# Patient Record
Sex: MALE | Race: WHITE | NOT HISPANIC OR LATINO | URBAN - METROPOLITAN AREA
[De-identification: names, ages, dates, MRNs, and addresses within clinical notes are randomized per-mention and may not be internally consistent; named-entity substitution may affect disease eponyms.]

---

## 2017-01-14 ENCOUNTER — EMERGENCY (EMERGENCY)
Facility: HOSPITAL | Age: 62
LOS: 1 days | Discharge: HOME | End: 2017-01-14
Attending: EMERGENCY MEDICINE
Payer: COMMERCIAL

## 2017-01-14 VITALS
RESPIRATION RATE: 20 BRPM | HEART RATE: 76 BPM | TEMPERATURE: 98 F | OXYGEN SATURATION: 100 % | DIASTOLIC BLOOD PRESSURE: 109 MMHG | SYSTOLIC BLOOD PRESSURE: 216 MMHG

## 2017-01-14 VITALS — DIASTOLIC BLOOD PRESSURE: 95 MMHG | SYSTOLIC BLOOD PRESSURE: 203 MMHG

## 2017-01-14 DIAGNOSIS — M79.89 OTHER SPECIFIED SOFT TISSUE DISORDERS: ICD-10-CM

## 2017-01-14 DIAGNOSIS — L03.011 CELLULITIS OF RIGHT FINGER: ICD-10-CM

## 2017-01-14 DIAGNOSIS — I10 ESSENTIAL (PRIMARY) HYPERTENSION: ICD-10-CM

## 2017-01-14 DIAGNOSIS — Z91.013 ALLERGY TO SEAFOOD: ICD-10-CM

## 2017-01-14 DIAGNOSIS — Z23 ENCOUNTER FOR IMMUNIZATION: ICD-10-CM

## 2017-01-14 PROCEDURE — 90471 IMMUNIZATION ADMIN: CPT

## 2017-01-14 PROCEDURE — 99283 EMERGENCY DEPT VISIT LOW MDM: CPT | Mod: 25

## 2017-01-14 PROCEDURE — 73130 X-RAY EXAM OF HAND: CPT | Mod: 26,RT

## 2017-01-14 PROCEDURE — 90715 TDAP VACCINE 7 YRS/> IM: CPT

## 2017-01-14 PROCEDURE — 99284 EMERGENCY DEPT VISIT MOD MDM: CPT

## 2017-01-14 PROCEDURE — 73130 X-RAY EXAM OF HAND: CPT

## 2017-01-14 RX ORDER — CEPHALEXIN 500 MG
500 CAPSULE ORAL ONCE
Qty: 0 | Refills: 0 | Status: DISCONTINUED | OUTPATIENT
Start: 2017-01-14 | End: 2017-01-14

## 2017-01-14 RX ORDER — TETANUS TOXOID, REDUCED DIPHTHERIA TOXOID AND ACELLULAR PERTUSSIS VACCINE, ADSORBED 5; 2.5; 8; 8; 2.5 [IU]/.5ML; [IU]/.5ML; UG/.5ML; UG/.5ML; UG/.5ML
0.5 SUSPENSION INTRAMUSCULAR ONCE
Qty: 0 | Refills: 0 | Status: COMPLETED | OUTPATIENT
Start: 2017-01-14 | End: 2017-01-14

## 2017-01-14 RX ORDER — AMLODIPINE BESYLATE 2.5 MG/1
1 TABLET ORAL
Qty: 14 | Refills: 0 | OUTPATIENT
Start: 2017-01-14 | End: 2017-01-28

## 2017-01-14 RX ADMIN — Medication 0.2 MILLIGRAM(S): at 15:54

## 2017-01-14 RX ADMIN — Medication 1 TABLET(S): at 15:53

## 2017-01-14 RX ADMIN — TETANUS TOXOID, REDUCED DIPHTHERIA TOXOID AND ACELLULAR PERTUSSIS VACCINE, ADSORBED 0.5 MILLILITER(S): 5; 2.5; 8; 8; 2.5 SUSPENSION INTRAMUSCULAR at 15:48

## 2017-01-14 NOTE — ED STATDOCS - CARE PLAN
Principal Discharge DX:	Cellulitis of other specified site  Secondary Diagnosis:	Essential hypertension

## 2017-01-14 NOTE — ED STATDOCS - DETAILS:
I, Josephine Msoley, performed the initial face to face bedside interview with this patient regarding history of present illness, review of symptoms and relevant past medical, social and family history.  I completed an independent physical examination.  I was the initial provider who evaluated this patient. I have signed out the follow up of any pending tests (i.e. labs, radiological studies) to the ACP.  I have communicated the patient’s plan of care and disposition with the ACP.  The history, relevant review of systems, past medical and surgical history, medical decision making, and physical examination was documented by the scribe in my presence and I attest to the accuracy of the documentation.

## 2017-01-14 NOTE — ED STATDOCS - OBJECTIVE STATEMENT
62 y/o male with hx of HTN presents to ED c/o erythema and swelling to 5th digit on right hand with slight pus onset this morning. Pt reports he had a wooden splinter in place a few days ago and he thought he had gotten it out. However, today pt woke up and had swelling to his finger with a white spot. He pushed the white spot and the splinter came out. Denies fever. Denies smoking. No further complaints at this time.

## 2017-01-14 NOTE — ED STATDOCS - NS ED MD SCRIBE ATTENDING SCRIBE SECTIONS
HISTORY OF PRESENT ILLNESS/REVIEW OF SYSTEMS/PHYSICAL EXAM/PAST MEDICAL/SURGICAL/SOCIAL HISTORY/HIV/INTAKE ASSESSMENT/SCREENINGS/DISPOSITION/VITAL SIGNS( Pullset)

## 2017-01-14 NOTE — ED STATDOCS - PROGRESS NOTE DETAILS
Patient has full finger extension. + mildly limited flexion of the right 5th finger. No forearm tenderness. No specific fluctuance noted. No discharge. Spoke to Dr. Shaina TORRES and discussed the case. The plan was to begin Augmentin and initiate warm soaks and have patient follow-up in office.  **Patient also reports of non-compliance with HTN medication (unknown med). He is between PCP's. Spoke to Dr. Shaina TORRES and discussed the case. The plan was to begin Augmentin and initiate warm soaks and have patient follow-up in office.  **Patient also reports of non-compliance with HTN medication (unknown med). He is between PCP's. No HA, dizziness, or visual changes at this time. BP is trending down. Will discharge home with BP medications prescribed.

## 2017-01-14 NOTE — ED ADULT TRIAGE NOTE - CHIEF COMPLAINT QUOTE
Patient arrived to ED today with c/o infection to right 5th digit.  Patient states he got a splinter about 2 days ago.

## 2020-09-13 ENCOUNTER — RESULT REVIEW (OUTPATIENT)
Age: 65
End: 2020-09-13

## 2020-09-14 ENCOUNTER — APPOINTMENT (OUTPATIENT)
Dept: DERMATOLOGY | Facility: CLINIC | Age: 65
End: 2020-09-14
Payer: COMMERCIAL

## 2020-09-14 PROCEDURE — 11102 TANGNTL BX SKIN SINGLE LES: CPT

## 2020-09-14 PROCEDURE — 99202 OFFICE O/P NEW SF 15 MIN: CPT | Mod: 25

## 2020-10-05 ENCOUNTER — RESULT REVIEW (OUTPATIENT)
Age: 65
End: 2020-10-05

## 2020-10-05 ENCOUNTER — APPOINTMENT (OUTPATIENT)
Dept: DERMATOLOGY | Facility: CLINIC | Age: 65
End: 2020-10-05
Payer: COMMERCIAL

## 2020-10-05 PROCEDURE — 11103 TANGNTL BX SKIN EA SEP/ADDL: CPT | Mod: 59

## 2020-10-05 PROCEDURE — 99212 OFFICE O/P EST SF 10 MIN: CPT | Mod: 25

## 2020-10-05 PROCEDURE — 11102 TANGNTL BX SKIN SINGLE LES: CPT

## 2022-04-07 ENCOUNTER — APPOINTMENT (OUTPATIENT)
Dept: DERMATOLOGY | Facility: CLINIC | Age: 67
End: 2022-04-07
Payer: COMMERCIAL

## 2022-04-07 PROCEDURE — 99214 OFFICE O/P EST MOD 30 MIN: CPT

## 2022-04-18 ENCOUNTER — APPOINTMENT (OUTPATIENT)
Dept: DERMATOLOGY | Facility: CLINIC | Age: 67
End: 2022-04-18

## 2023-06-20 ENCOUNTER — NON-APPOINTMENT (OUTPATIENT)
Age: 68
End: 2023-06-20

## 2023-06-20 ENCOUNTER — APPOINTMENT (OUTPATIENT)
Dept: RHEUMATOLOGY | Facility: CLINIC | Age: 68
End: 2023-06-20
Payer: COMMERCIAL

## 2023-06-20 VITALS
SYSTOLIC BLOOD PRESSURE: 142 MMHG | OXYGEN SATURATION: 95 % | DIASTOLIC BLOOD PRESSURE: 80 MMHG | TEMPERATURE: 97.3 F | WEIGHT: 236 LBS | HEIGHT: 71 IN | BODY MASS INDEX: 33.04 KG/M2 | HEART RATE: 81 BPM

## 2023-06-20 DIAGNOSIS — Z84.0 FAMILY HISTORY OF DISEASES OF THE SKIN AND SUBCUTANEOUS TISSUE: ICD-10-CM

## 2023-06-20 PROBLEM — Z00.00 ENCOUNTER FOR PREVENTIVE HEALTH EXAMINATION: Status: ACTIVE | Noted: 2023-06-20

## 2023-06-20 PROCEDURE — 99204 OFFICE O/P NEW MOD 45 MIN: CPT

## 2023-06-20 NOTE — ASSESSMENT
[FreeTextEntry1] : 68M with HTN, HLD, and psoriasis, here for evaluation for psoriatic arthritis. \par Based on patient's history of psoriasis along with dactylitis noted in b/l hands, as well as nail pitting seen on exam, there is a high suspicion for psoriatic arthritis.\par \par At this time will treat with meloxicam 7.5 mg BID until labs/ultrasound have resulted, but plan will likely be to start a TNF-i such as Humira subQ injections once every 2 weeks- patient was given an information handout on Humira. \par At this time will check labs including ESR, CRP, HLA B27, tests for RA, SLE, and Sjogrens, and will also order US of bilateral hands and wrists. \par \par Pending lab results will likely start TNF-i (not ordered yet). Patient was already advised risks vs. benefits of Humira including risk of immunosuppression/vulnerability to infection.

## 2023-06-20 NOTE — HISTORY OF PRESENT ILLNESS
[FreeTextEntry1] : 68M HTN on amlodipine and HCTZ, HLD, here for evaluation for possible PsA. has had scaly patches on scalp, RLE, as well as elbows and umbilicus for past 2 years; saw a dermatologist 1 year ago who stated he may have psoriatic arthritis but he was never started on the medication. \par Pt states his hands have been stiff and swollen for past couple of years. Has difficulty making a fist in either hand. Pain in R. wrist occasionally as well as R. ankle joint. He has taken Alleve PRN once daily which relieves the pain mildly.\par \par He does have L. knee pain but he needs a L. knee replacement. \par \par Has been given topical steroids for the scalp. \par No eye pain, redness or blurry vision. No dyspnea. No oral or nasal ulcers. No hematuria. No hair or weight loss. No Raynauds. \par No nail pitting or ridging reported (although seen on exam today). No hx of IBD. \par No hx of CHF, cancer or demyelinating diseases.\par No alcohol use. \par \par Family history: Mother had extensive psoriasis; maternal uncles had psoriasis.  [Weight Loss] : no weight loss [Malar Facial Rash] : no malar facial rash [Skin Lesions] : skin lesions [Skin Nodules] : no skin nodules [Oral Ulcers] : no oral ulcers [Dry Mouth] : no dry mouth [Shortness of Breath] : no shortness of breath [Chest Pain] : no chest pain [Arthralgias] : arthralgias [Joint Swelling] : joint swelling [Morning Stiffness] : morning stiffness [Visual Changes] : no visual changes [Eye Pain] : no eye pain [Eye Redness] : no eye redness [Dry Eyes] : no dry eyes

## 2023-06-20 NOTE — REVIEW OF SYSTEMS
[Recent Weight Loss (___ Lbs)] : no recent weight loss [Arthralgias] : arthralgias [Joint Pain] : joint pain [Joint Swelling] : joint swelling [Joint Stiffness] : joint stiffness [As Noted in HPI] : as noted in HPI [Skin Lesions] : skin lesion [Negative] : Heme/Lymph [de-identified] : psoriasis

## 2023-06-20 NOTE — PHYSICAL EXAM
[General Appearance - Alert] : alert [General Appearance - In No Acute Distress] : in no acute distress [General Appearance - Well Developed] : well developed [General Appearance - Well-Appearing] : healthy appearing [Sclera] : the sclera and conjunctiva were normal [Extraocular Movements] : extraocular movements were intact [Outer Ear] : the ears and nose were normal in appearance [Neck Appearance] : the appearance of the neck was normal [] : no respiratory distress [Exaggerated Use Of Accessory Muscles For Inspiration] : no accessory muscle use [Edema] : there was no peripheral edema [FreeTextEntry1] : psoriasis patches on b/l elbow extensor surfaces, scalp, and RLE overlying shin with erythematous scales.  [No Focal Deficits] : no focal deficits [Oriented To Time, Place, And Person] : oriented to person, place, and time [Affect] : the affect was normal [Mood] : the mood was normal

## 2023-07-06 ENCOUNTER — NON-APPOINTMENT (OUTPATIENT)
Age: 68
End: 2023-07-06

## 2023-07-06 LAB
ALBUMIN SERPL ELPH-MCNC: 4.5 G/DL
ALP BLD-CCNC: 189 U/L
ALT SERPL-CCNC: 16 U/L
ANA SER IF-ACNC: NEGATIVE
ANION GAP SERPL CALC-SCNC: 14 MMOL/L
APPEARANCE: CLEAR
AST SERPL-CCNC: 15 U/L
BILIRUB SERPL-MCNC: 0.3 MG/DL
BILIRUBIN URINE: NEGATIVE
BLOOD URINE: NEGATIVE
BUN SERPL-MCNC: 28 MG/DL
C3 SERPL-MCNC: 155 MG/DL
C4 SERPL-MCNC: 33 MG/DL
CALCIUM SERPL-MCNC: 10 MG/DL
CCP AB SER IA-ACNC: <8 UNITS
CENTROMERE IGG SER-ACNC: <0.2 AL
CHLORIDE SERPL-SCNC: 104 MMOL/L
CO2 SERPL-SCNC: 24 MMOL/L
COLOR: YELLOW
CREAT SERPL-MCNC: 0.94 MG/DL
CREAT SPEC-SCNC: 156 MG/DL
CREAT/PROT UR: 0.1 RATIO
CRP SERPL-MCNC: 9 MG/L
DSDNA AB SER-ACNC: 24 IU/ML
EGFR: 87 ML/MIN/1.73M2
ENA RNP AB SER IA-ACNC: <0.2 AL
ENA SCL70 IGG SER IA-ACNC: <0.2 AL
ENA SM AB SER IA-ACNC: <0.2 AL
ENA SS-A AB SER IA-ACNC: <0.2 AL
ENA SS-B AB SER IA-ACNC: <0.2 AL
ERYTHROCYTE [SEDIMENTATION RATE] IN BLOOD BY WESTERGREN METHOD: 34 MM/HR
GLUCOSE QUALITATIVE U: NEGATIVE MG/DL
GLUCOSE SERPL-MCNC: 93 MG/DL
HAV IGM SER QL: NONREACTIVE
HBV CORE IGG+IGM SER QL: NONREACTIVE
HBV CORE IGM SER QL: NONREACTIVE
HBV SURFACE AG SER QL: NONREACTIVE
HCV AB SER QL: NONREACTIVE
HCV S/CO RATIO: 0.09 S/CO
HLA-B27 QL NAA+PROBE: NORMAL
KETONES URINE: NEGATIVE MG/DL
LEUKOCYTE ESTERASE URINE: NEGATIVE
M TB IFN-G BLD-IMP: NEGATIVE
NITRITE URINE: NEGATIVE
PH URINE: 5.5
POTASSIUM SERPL-SCNC: 4.2 MMOL/L
PROT SERPL-MCNC: 7 G/DL
PROT UR-MCNC: 15 MG/DL
PROTEIN URINE: NEGATIVE MG/DL
QUANTIFERON TB PLUS MITOGEN MINUS NIL: 4.33 IU/ML
QUANTIFERON TB PLUS NIL: 0.02 IU/ML
QUANTIFERON TB PLUS TB1 MINUS NIL: -0.01 IU/ML
QUANTIFERON TB PLUS TB2 MINUS NIL: -0.01 IU/ML
RF+CCP IGG SER-IMP: NEGATIVE
RHEUMATOID FACT SER QL: <10 IU/ML
SODIUM SERPL-SCNC: 141 MMOL/L
SPECIFIC GRAVITY URINE: 1.02
URATE SERPL-MCNC: 6.6 MG/DL
UROBILINOGEN URINE: 0.2 MG/DL

## 2023-09-18 ENCOUNTER — RESULT REVIEW (OUTPATIENT)
Age: 68
End: 2023-09-18

## 2023-09-18 ENCOUNTER — APPOINTMENT (OUTPATIENT)
Dept: RHEUMATOLOGY | Facility: CLINIC | Age: 68
End: 2023-09-18
Payer: COMMERCIAL

## 2023-09-18 VITALS
DIASTOLIC BLOOD PRESSURE: 89 MMHG | WEIGHT: 230 LBS | BODY MASS INDEX: 32.2 KG/M2 | HEART RATE: 80 BPM | HEIGHT: 71 IN | SYSTOLIC BLOOD PRESSURE: 157 MMHG | TEMPERATURE: 97.2 F | OXYGEN SATURATION: 98 %

## 2023-09-18 DIAGNOSIS — M25.569 PAIN IN UNSPECIFIED KNEE: ICD-10-CM

## 2023-09-18 DIAGNOSIS — R79.82 ELEVATED C-REACTIVE PROTEIN (CRP): ICD-10-CM

## 2023-09-18 DIAGNOSIS — M54.50 LOW BACK PAIN, UNSPECIFIED: ICD-10-CM

## 2023-09-18 DIAGNOSIS — Z78.9 OTHER SPECIFIED HEALTH STATUS: ICD-10-CM

## 2023-09-18 PROCEDURE — 99214 OFFICE O/P EST MOD 30 MIN: CPT

## 2023-09-18 RX ORDER — AMLODIPINE BESYLATE AND BENAZEPRIL HYDROCHLORIDE 5; 20 MG/1; MG/1
5-20 CAPSULE ORAL
Refills: 0 | Status: ACTIVE | COMMUNITY

## 2023-09-18 RX ORDER — APREMILAST 10-20-30MG
10 & 20 & 30 KIT ORAL
Qty: 1 | Refills: 1 | Status: DISCONTINUED | COMMUNITY
Start: 2023-07-06 | End: 2023-09-18

## 2023-09-20 LAB
ALBUMIN SERPL ELPH-MCNC: 4.4 G/DL
ALP BLD-CCNC: 171 U/L
ALT SERPL-CCNC: 19 U/L
ANION GAP SERPL CALC-SCNC: 10 MMOL/L
AST SERPL-CCNC: 18 U/L
BASOPHILS # BLD AUTO: 0.06 K/UL
BASOPHILS NFR BLD AUTO: 0.8 %
BILIRUB SERPL-MCNC: 0.3 MG/DL
BUN SERPL-MCNC: 22 MG/DL
CALCIUM SERPL-MCNC: 9.7 MG/DL
CHLORIDE SERPL-SCNC: 105 MMOL/L
CO2 SERPL-SCNC: 25 MMOL/L
CREAT SERPL-MCNC: 0.88 MG/DL
CRP SERPL-MCNC: <3 MG/L
EGFR: 93 ML/MIN/1.73M2
EOSINOPHIL # BLD AUTO: 0.27 K/UL
EOSINOPHIL NFR BLD AUTO: 3.5 %
ERYTHROCYTE [SEDIMENTATION RATE] IN BLOOD BY WESTERGREN METHOD: 23 MM/HR
GLUCOSE SERPL-MCNC: 94 MG/DL
HCT VFR BLD CALC: 45.7 %
HGB BLD-MCNC: 14.6 G/DL
HIV1+2 AB SPEC QL IA.RAPID: NONREACTIVE
IMM GRANULOCYTES NFR BLD AUTO: 0.3 %
LYMPHOCYTES # BLD AUTO: 1.45 K/UL
LYMPHOCYTES NFR BLD AUTO: 18.5 %
MAN DIFF?: NORMAL
MCHC RBC-ENTMCNC: 29.7 PG
MCHC RBC-ENTMCNC: 31.9 GM/DL
MCV RBC AUTO: 92.9 FL
MONOCYTES # BLD AUTO: 0.81 K/UL
MONOCYTES NFR BLD AUTO: 10.4 %
NEUTROPHILS # BLD AUTO: 5.21 K/UL
NEUTROPHILS NFR BLD AUTO: 66.5 %
PLATELET # BLD AUTO: 295 K/UL
POTASSIUM SERPL-SCNC: 4.4 MMOL/L
PROT SERPL-MCNC: 7 G/DL
RBC # BLD: 4.92 M/UL
RBC # FLD: 13.7 %
SODIUM SERPL-SCNC: 141 MMOL/L
WBC # FLD AUTO: 7.82 K/UL

## 2023-12-18 ENCOUNTER — APPOINTMENT (OUTPATIENT)
Dept: RADIOLOGY | Facility: CLINIC | Age: 68
End: 2023-12-18
Payer: COMMERCIAL

## 2023-12-18 ENCOUNTER — OUTPATIENT (OUTPATIENT)
Dept: OUTPATIENT SERVICES | Facility: HOSPITAL | Age: 68
LOS: 1 days | End: 2023-12-18
Payer: COMMERCIAL

## 2023-12-18 DIAGNOSIS — M25.569 PAIN IN UNSPECIFIED KNEE: ICD-10-CM

## 2023-12-18 PROCEDURE — 72100 X-RAY EXAM L-S SPINE 2/3 VWS: CPT | Mod: 26

## 2023-12-18 PROCEDURE — 72100 X-RAY EXAM L-S SPINE 2/3 VWS: CPT

## 2023-12-18 PROCEDURE — 72200 X-RAY EXAM SI JOINTS: CPT

## 2023-12-18 PROCEDURE — 72200 X-RAY EXAM SI JOINTS: CPT | Mod: 26

## 2023-12-18 PROCEDURE — 73565 X-RAY EXAM OF KNEES: CPT | Mod: 26

## 2023-12-18 PROCEDURE — 73565 X-RAY EXAM OF KNEES: CPT

## 2023-12-26 ENCOUNTER — RX RENEWAL (OUTPATIENT)
Age: 68
End: 2023-12-26

## 2023-12-26 RX ORDER — MELOXICAM 7.5 MG/1
7.5 TABLET ORAL DAILY
Qty: 60 | Refills: 0 | Status: ACTIVE | COMMUNITY
Start: 2023-06-20 | End: 1900-01-01

## 2023-12-27 ENCOUNTER — APPOINTMENT (OUTPATIENT)
Dept: RHEUMATOLOGY | Facility: CLINIC | Age: 68
End: 2023-12-27
Payer: COMMERCIAL

## 2023-12-27 VITALS
HEART RATE: 84 BPM | TEMPERATURE: 97.3 F | WEIGHT: 223 LBS | OXYGEN SATURATION: 96 % | BODY MASS INDEX: 31.22 KG/M2 | DIASTOLIC BLOOD PRESSURE: 92 MMHG | HEIGHT: 71 IN | SYSTOLIC BLOOD PRESSURE: 164 MMHG

## 2023-12-27 DIAGNOSIS — M47.816 SPONDYLOSIS W/OUT MYELOPATHY OR RADICULOPATHY, LUMBAR REGION: ICD-10-CM

## 2023-12-27 PROCEDURE — 99214 OFFICE O/P EST MOD 30 MIN: CPT

## 2023-12-27 RX ORDER — HYDROCHLOROTHIAZIDE 12.5 MG/1
TABLET ORAL
Refills: 0 | Status: ACTIVE | COMMUNITY

## 2023-12-27 RX ORDER — CLOBETASOL PROPIONATE 0.5 MG/G
0.05 OINTMENT TOPICAL
Qty: 45 | Refills: 0 | Status: ACTIVE | COMMUNITY
Start: 2023-09-18 | End: 1900-01-01

## 2023-12-27 NOTE — HISTORY OF PRESENT ILLNESS
[FreeTextEntry1] : 67 y/o M w/ HTN; HLD; reports FH of Psoriasis in Mother and maternal uncles; with patient himself having history of Psoriasis of scalp, Rt>Lt shins/knees and very mildly of the arms; w/ +HLA-B27; with high ESR/CRP; states he had joint pain in the hands w/ trouble making a fist; Rt wrist; Rt ankle that is much improved on Otezla now.  Today he states he is taking Otezla 30 mg BID that he is tolerating well. Denies any swelling or redness or warmth of any joints on Otezla now. He states the psoriasis of the skin has improved on Otezla. Today has mild psoriasis of scalp & mild psoriasis of Rt shin. States clobetasol shampoo and ointment helps. He states the joint pain is much improved on Otezla. States he notes intermittent Lt>rt knee pain w/ doing stairs or walking a lot and told in the past he needs knee replacement & did xray to review. He states he notes LBP worse w/ sitting; better w/ stretching; not much pain today. Denies any loss of bowel incontinence or saddle anesthesias. Denies any fever/chills, no rashes, no ulcers, no dry eyes, no dry mouth, no raynaud's, no infectious diarrhea or  symptoms at this time.

## 2023-12-27 NOTE — ASSESSMENT
[FreeTextEntry1] : 67 y/o M w/ HTN; HLD; reports FH of Psoriasis in Mother and maternal uncles; with patient himself having history of Psoriasis of scalp, Rt>Lt shins/knees and very mildly of the arms; w/ +HLA-B27; with high ESR/CRP states he had joint pain in the hands w/ trouble making a fist; Rt wrist; Rt ankle that is much improved on Otezla now.  PsA: -reviewed labs 9/18/23 w/ CRP normal now; decreasing ESR=23 (from 34); CBC ok; CMP ok; HIV negative; 6/20/23 + HLA-B27; high ESR=34; high CRP=9; hepatitis negative; quantiferon gold negative -discussed r/b/s of refilling Otezla 30mg PO BID with pt agreeable and prescription sent as below -labs as below to monitor today -labs as below given to do for f/u also  Psoriasis: mild psoriasis of scalp, mild psoriasis of Rt shin  -discussed r/b/s of clobetasol shampoo PRN w/ pt agreeable and prescription sent as below -discussed r/b/s of clobetasol ointment PRN w/ pt agreeable and prescription sent as below  Knee OA: reviewed on xray b/l knees 12/18/23 w/ b/l OA -consider steroid injections -states told in the past that he needs Lt knee replacement  LBP: intermittent -Reviewed xray L spine 12/18/23 w/ anterolisthesis; SI normal -Meloxicam PRN w/ food sparingly if needed  -educated on symptoms to monitor for in detail and alert us if any concerns. -knows to stay up to date on health maintenance w/ PCP -f/u in 10-12 weeks w/ labs please or sooner as needed.

## 2023-12-27 NOTE — PHYSICAL EXAM
[General Appearance - Alert] : alert [General Appearance - In No Acute Distress] : in no acute distress [Sclera] : the sclera and conjunctiva were normal [Extraocular Movements] : extraocular movements were intact [Outer Ear] : the ears and nose were normal in appearance [Neck Appearance] : the appearance of the neck was normal [] : no respiratory distress [Respiration, Rhythm And Depth] : normal respiratory rhythm and effort [Heart Rate And Rhythm] : heart rate was normal and rhythm regular [Heart Sounds] : normal S1 and S2 [Abdomen Soft] : soft [Abdomen Tenderness] : non-tender [Cervical Lymph Nodes Enlarged Anterior Bilaterally] : anterior cervical [Supraclavicular Lymph Nodes Enlarged Bilaterally] : supraclavicular [No CVA Tenderness] : no ~M costovertebral angle tenderness [Motor Tone] : muscle strength and tone were normal [No Focal Deficits] : no focal deficits [Impaired Insight] : insight and judgment were intact [Mood] : the mood was normal [FreeTextEntry1] : mild psoriasis of scalp, mildly Rt shin psoriasis

## 2023-12-28 LAB
ALBUMIN SERPL ELPH-MCNC: 4.4 G/DL
ALP BLD-CCNC: 174 U/L
ALT SERPL-CCNC: 22 U/L
ANION GAP SERPL CALC-SCNC: 12 MMOL/L
AST SERPL-CCNC: 20 U/L
BASOPHILS # BLD AUTO: 0.07 K/UL
BASOPHILS NFR BLD AUTO: 0.9 %
BILIRUB SERPL-MCNC: 0.3 MG/DL
BUN SERPL-MCNC: 18 MG/DL
CALCIUM SERPL-MCNC: 10.1 MG/DL
CHLORIDE SERPL-SCNC: 103 MMOL/L
CO2 SERPL-SCNC: 28 MMOL/L
CREAT SERPL-MCNC: 0.93 MG/DL
CRP SERPL-MCNC: <3 MG/L
EGFR: 88 ML/MIN/1.73M2
EOSINOPHIL # BLD AUTO: 0.29 K/UL
EOSINOPHIL NFR BLD AUTO: 3.6 %
ERYTHROCYTE [SEDIMENTATION RATE] IN BLOOD BY WESTERGREN METHOD: 28 MM/HR
GLUCOSE SERPL-MCNC: 98 MG/DL
HCT VFR BLD CALC: 46.1 %
HGB BLD-MCNC: 14.5 G/DL
IMM GRANULOCYTES NFR BLD AUTO: 0.2 %
LYMPHOCYTES # BLD AUTO: 1.54 K/UL
LYMPHOCYTES NFR BLD AUTO: 18.9 %
MAN DIFF?: NORMAL
MCHC RBC-ENTMCNC: 29.1 PG
MCHC RBC-ENTMCNC: 31.5 GM/DL
MCV RBC AUTO: 92.4 FL
MONOCYTES # BLD AUTO: 0.67 K/UL
MONOCYTES NFR BLD AUTO: 8.2 %
NEUTROPHILS # BLD AUTO: 5.56 K/UL
NEUTROPHILS NFR BLD AUTO: 68.2 %
PLATELET # BLD AUTO: 304 K/UL
POTASSIUM SERPL-SCNC: 4.4 MMOL/L
PROT SERPL-MCNC: 6.8 G/DL
RBC # BLD: 4.99 M/UL
RBC # FLD: 13.2 %
SODIUM SERPL-SCNC: 142 MMOL/L
WBC # FLD AUTO: 8.15 K/UL

## 2024-01-24 ENCOUNTER — RX RENEWAL (OUTPATIENT)
Age: 69
End: 2024-01-24

## 2024-03-06 ENCOUNTER — RX RENEWAL (OUTPATIENT)
Age: 69
End: 2024-03-06

## 2024-03-07 ENCOUNTER — RX RENEWAL (OUTPATIENT)
Age: 69
End: 2024-03-07

## 2024-03-07 RX ORDER — DICLOFENAC SODIUM 1% 10 MG/G
1 GEL TOPICAL
Qty: 100 | Refills: 0 | Status: ACTIVE | COMMUNITY
Start: 2023-12-27 | End: 1900-01-01

## 2024-03-18 ENCOUNTER — APPOINTMENT (OUTPATIENT)
Dept: RHEUMATOLOGY | Facility: CLINIC | Age: 69
End: 2024-03-18
Payer: COMMERCIAL

## 2024-03-18 VITALS
HEIGHT: 71 IN | TEMPERATURE: 98 F | HEART RATE: 86 BPM | OXYGEN SATURATION: 97 % | SYSTOLIC BLOOD PRESSURE: 152 MMHG | BODY MASS INDEX: 30.8 KG/M2 | WEIGHT: 220 LBS | DIASTOLIC BLOOD PRESSURE: 84 MMHG

## 2024-03-18 DIAGNOSIS — L40.9 PSORIASIS, UNSPECIFIED: ICD-10-CM

## 2024-03-18 DIAGNOSIS — R70.0 ELEVATED ERYTHROCYTE SEDIMENTATION RATE: ICD-10-CM

## 2024-03-18 DIAGNOSIS — L40.50 ARTHROPATHIC PSORIASIS, UNSPECIFIED: ICD-10-CM

## 2024-03-18 DIAGNOSIS — M17.0 BILATERAL PRIMARY OSTEOARTHRITIS OF KNEE: ICD-10-CM

## 2024-03-18 DIAGNOSIS — M43.10 SPONDYLOLISTHESIS, SITE UNSPECIFIED: ICD-10-CM

## 2024-03-18 DIAGNOSIS — Z15.89 GENETIC SUSCEPTIBILITY TO OTHER DISEASE: ICD-10-CM

## 2024-03-18 DIAGNOSIS — M25.562 PAIN IN LEFT KNEE: ICD-10-CM

## 2024-03-18 PROCEDURE — G2211 COMPLEX E/M VISIT ADD ON: CPT

## 2024-03-18 PROCEDURE — 99214 OFFICE O/P EST MOD 30 MIN: CPT | Mod: 25

## 2024-03-18 PROCEDURE — 20610 DRAIN/INJ JOINT/BURSA W/O US: CPT

## 2024-03-18 RX ORDER — TRIAMCINOLONE ACETONIDE 40 MG/ML
40 SUSPENSION INTRA-ARTERIAL; INTRAMUSCULAR
Qty: 1 | Refills: 0 | Status: COMPLETED | OUTPATIENT
Start: 2024-03-18

## 2024-03-18 RX ORDER — CLOBETASOL PROPIONATE 0.05 G/100ML
0.05 SHAMPOO TOPICAL DAILY
Qty: 1 | Refills: 1 | Status: ACTIVE | COMMUNITY
Start: 2023-09-18 | End: 1900-01-01

## 2024-03-18 RX ADMIN — TRIAMCINOLONE ACETONIDE 40 MG/ML: 40 INJECTION, SUSPENSION INTRA-ARTICULAR; INTRAMUSCULAR at 00:00

## 2024-03-18 NOTE — HISTORY OF PRESENT ILLNESS
[FreeTextEntry1] : 68 y/o M w/ HTN; HLD; reports FH of Psoriasis in Mother and maternal uncles; with patient himself having history of Psoriasis of scalp, Rt>Lt shins/knees and very mildly of the arms; w/ +HLA-B27; with high ESR/CRP; states he had joint pain in the hands w/ trouble making a fist; Rt wrist; Rt ankle that is much improved on Otezla now.  Today he states he is taking Otezla 30 mg BID that he is tolerating well. Denies any swelling or redness or warmth of any joints on Otezla now. He states the psoriasis of the skin has improved on Otezla. Today has mild psoriasis of scalp & mild psoriasis of Rt shin. States clobetasol shampoo and ointment helps. He states the joint pain is much improved on Otezla. Today he states he has achy left knee pain w/ OA and would like steroid injection to relieve the pain. States he notes intermittent Lt>rt knee pain w/ doing stairs or walking a lot and told in the past he needs knee replacement.  He states he notes LBP worse w/ sitting; better w/ stretching; not much pain today. Denies any loss of bowel incontinence or saddle anesthesias. Denies any fever/chills, no rashes, no ulcers, no dry eyes, no dry mouth, no raynaud's, no infectious diarrhea or  symptoms at this time.

## 2024-03-18 NOTE — REASON FOR VISIT
[Follow-Up: _____] : a [unfilled] follow-up visit [FreeTextEntry1] : PsA; psoriasis; OA; labs/med; Lt knee pain

## 2024-03-18 NOTE — REVIEW OF SYSTEMS
[As Noted in HPI] : as noted in HPI [Chills] : no chills [Fever] : no fever [Red Eyes] : eyes not red [Eye Pain] : no eye pain [Chest Pain] : no chest pain [Nosebleeds] : no nosebleeds [Abdominal Pain] : no abdominal pain [Shortness Of Breath] : no shortness of breath [Dysuria] : no dysuria [Confused] : no confusion [Suicidal] : not suicidal [Muscle Weakness] : no muscle weakness [Easy Bleeding] : no tendency for easy bleeding

## 2024-03-18 NOTE — PHYSICAL EXAM
[General Appearance - Alert] : alert [General Appearance - In No Acute Distress] : in no acute distress [Sclera] : the sclera and conjunctiva were normal [Extraocular Movements] : extraocular movements were intact [Outer Ear] : the ears and nose were normal in appearance [Neck Appearance] : the appearance of the neck was normal [Respiration, Rhythm And Depth] : normal respiratory rhythm and effort [Heart Rate And Rhythm] : heart rate was normal and rhythm regular [Heart Sounds] : normal S1 and S2 [Abdomen Soft] : soft [Abdomen Tenderness] : non-tender [Cervical Lymph Nodes Enlarged Anterior Bilaterally] : anterior cervical [Supraclavicular Lymph Nodes Enlarged Bilaterally] : supraclavicular [No CVA Tenderness] : no ~M costovertebral angle tenderness [Motor Tone] : muscle strength and tone were normal [] : no rash [No Focal Deficits] : no focal deficits [Mood] : the mood was normal [Impaired Insight] : insight and judgment were intact [FreeTextEntry1] : Lt knee tenderness w/ good ROM; No synovitis or effusion on exam noted today; Good ROM in b/l shoulders, no pelvic/girdle stiffness and able to stand up without using his hands

## 2024-03-18 NOTE — ASSESSMENT
[FreeTextEntry1] : 68 y/o M w/ HTN; HLD; reports FH of Psoriasis in Mother and maternal uncles; with patient himself having history of Psoriasis of scalp, Rt>Lt shins/knees and very mildly of the arms; w/ +HLA-B27; with high ESR/CRP states he had joint pain in the hands w/ trouble making a fist; Rt wrist; Rt ankle that is much improved on Otezla now.  PsA: -reviewed labs 12/27/23 w/ CRP normal now; raised ESR= 28 (from 23 from 34); CBC ok; CMP ok; 9/18/23 HIV negative; 6/20/23 + HLA-B27; high ESR=34; high CRP=9; hepatitis negative; quantiferon gold negative -discussed r/b/s of refilling Otezla 30mg PO BID with pt agreeable and prescription sent as below -labs as below to monitor   Psoriasis: mild psoriasis of scalp, mild psoriasis of Rt shin -discussed r/b/s of clobetasol shampoo PRN w/ pt agreeable and prescription sent as below -has clobetasol ointment PRN   Lt knee pain/OA: offered Kenalog 40 mg injection today w/ r/b/s discussed and patient agreeable. -reviewed on xray b/l knees 12/18/23 w/ b/l OA -states told in the past that he needs Lt knee replacement -has voltaren gel 1% PRN   LBP: intermittent -xray L spine 12/18/23 w/ anterolisthesis; SI normal -Meloxicam PRN w/ food sparingly if needed  -educated on symptoms to monitor for in detail and alert us if any concerns. -knows to stay up to date on health maintenance w/ PCP -f/u in 10-12 weeks w/ labs please or sooner as needed.

## 2024-03-18 NOTE — PROCEDURE
[Patient] : the patient [Today's Date:] : Date: [unfilled] [Risks] : risks [Benefits] : benefits [Alternatives] : alternatives [Consent Obtained] : written consent was obtained prior to the procedure and is detailed in the patient's record [#1 Site: ______] : #1 site identified in the [unfilled] [Therapeutic] : therapeutic [Ethyl Chloride] : ethyl chloride [Betadine] : betadine solution [Alcohol] : alcohol [25 gauge 1.5 inch] : A 25 gauge 1.5 inch needle was used [___ml 1% Lidocaine] : [unfilled] ml of 1% lidocaine [Tolerated Well] : the patient tolerated the procedure well [No Complications] : there were no complications [de-identified] : Kenalog 40 mg

## 2024-06-12 ENCOUNTER — APPOINTMENT (OUTPATIENT)
Dept: RHEUMATOLOGY | Facility: CLINIC | Age: 69
End: 2024-06-12

## 2024-06-24 ENCOUNTER — RX RENEWAL (OUTPATIENT)
Age: 69
End: 2024-06-24

## 2024-06-24 RX ORDER — APREMILAST 30 MG/1
30 TABLET, FILM COATED ORAL
Qty: 180 | Refills: 0 | Status: ACTIVE | COMMUNITY
Start: 2023-07-10 | End: 1900-01-01

## 2024-09-21 ENCOUNTER — RX RENEWAL (OUTPATIENT)
Age: 69
End: 2024-09-21

## 2024-09-24 ENCOUNTER — RESULT REVIEW (OUTPATIENT)
Age: 69
End: 2024-09-24

## 2024-09-24 ENCOUNTER — TRANSCRIPTION ENCOUNTER (OUTPATIENT)
Age: 69
End: 2024-09-24

## 2024-09-24 ENCOUNTER — OUTPATIENT (OUTPATIENT)
Dept: OUTPATIENT SERVICES | Facility: HOSPITAL | Age: 69
LOS: 1 days | End: 2024-09-24
Payer: COMMERCIAL

## 2024-09-24 VITALS
DIASTOLIC BLOOD PRESSURE: 87 MMHG | SYSTOLIC BLOOD PRESSURE: 130 MMHG | OXYGEN SATURATION: 98 % | HEART RATE: 59 BPM | RESPIRATION RATE: 17 BRPM

## 2024-09-24 VITALS
RESPIRATION RATE: 16 BRPM | DIASTOLIC BLOOD PRESSURE: 98 MMHG | WEIGHT: 227.96 LBS | SYSTOLIC BLOOD PRESSURE: 126 MMHG | HEIGHT: 71 IN | OXYGEN SATURATION: 100 % | HEART RATE: 130 BPM

## 2024-09-24 DIAGNOSIS — I48.0 PAROXYSMAL ATRIAL FIBRILLATION: ICD-10-CM

## 2024-09-24 DIAGNOSIS — Z98.890 OTHER SPECIFIED POSTPROCEDURAL STATES: Chronic | ICD-10-CM

## 2024-09-24 LAB
ANION GAP SERPL CALC-SCNC: 15 MMOL/L — SIGNIFICANT CHANGE UP (ref 5–17)
BUN SERPL-MCNC: 24.8 MG/DL — HIGH (ref 8–20)
CALCIUM SERPL-MCNC: 9.4 MG/DL — SIGNIFICANT CHANGE UP (ref 8.4–10.5)
CHLORIDE SERPL-SCNC: 100 MMOL/L — SIGNIFICANT CHANGE UP (ref 96–108)
CO2 SERPL-SCNC: 24 MMOL/L — SIGNIFICANT CHANGE UP (ref 22–29)
CREAT SERPL-MCNC: 1.14 MG/DL — SIGNIFICANT CHANGE UP (ref 0.5–1.3)
EGFR: 69 ML/MIN/1.73M2 — SIGNIFICANT CHANGE UP
GLUCOSE SERPL-MCNC: 156 MG/DL — HIGH (ref 70–99)
HCT VFR BLD CALC: 48.6 % — SIGNIFICANT CHANGE UP (ref 39–50)
HGB BLD-MCNC: 15.7 G/DL — SIGNIFICANT CHANGE UP (ref 13–17)
MAGNESIUM SERPL-MCNC: 2.2 MG/DL — SIGNIFICANT CHANGE UP (ref 1.8–2.6)
MCHC RBC-ENTMCNC: 29.3 PG — SIGNIFICANT CHANGE UP (ref 27–34)
MCHC RBC-ENTMCNC: 32.3 GM/DL — SIGNIFICANT CHANGE UP (ref 32–36)
MCV RBC AUTO: 90.8 FL — SIGNIFICANT CHANGE UP (ref 80–100)
PLATELET # BLD AUTO: 283 K/UL — SIGNIFICANT CHANGE UP (ref 150–400)
POTASSIUM SERPL-MCNC: 3.2 MMOL/L — LOW (ref 3.5–5.3)
POTASSIUM SERPL-SCNC: 3.2 MMOL/L — LOW (ref 3.5–5.3)
RBC # BLD: 5.35 M/UL — SIGNIFICANT CHANGE UP (ref 4.2–5.8)
RBC # FLD: 13.7 % — SIGNIFICANT CHANGE UP (ref 10.3–14.5)
SODIUM SERPL-SCNC: 139 MMOL/L — SIGNIFICANT CHANGE UP (ref 135–145)
WBC # BLD: 12.52 K/UL — HIGH (ref 3.8–10.5)
WBC # FLD AUTO: 12.52 K/UL — HIGH (ref 3.8–10.5)

## 2024-09-24 PROCEDURE — 36415 COLL VENOUS BLD VENIPUNCTURE: CPT

## 2024-09-24 PROCEDURE — 93325 DOPPLER ECHO COLOR FLOW MAPG: CPT

## 2024-09-24 PROCEDURE — 92960 CARDIOVERSION ELECTRIC EXT: CPT

## 2024-09-24 PROCEDURE — 83735 ASSAY OF MAGNESIUM: CPT

## 2024-09-24 PROCEDURE — 93312 ECHO TRANSESOPHAGEAL: CPT

## 2024-09-24 PROCEDURE — 93325 DOPPLER ECHO COLOR FLOW MAPG: CPT | Mod: 26

## 2024-09-24 PROCEDURE — 93312 ECHO TRANSESOPHAGEAL: CPT | Mod: 26

## 2024-09-24 PROCEDURE — 85027 COMPLETE CBC AUTOMATED: CPT

## 2024-09-24 PROCEDURE — 93320 DOPPLER ECHO COMPLETE: CPT

## 2024-09-24 PROCEDURE — 80048 BASIC METABOLIC PNL TOTAL CA: CPT

## 2024-09-24 PROCEDURE — 93005 ELECTROCARDIOGRAM TRACING: CPT

## 2024-09-24 PROCEDURE — 93320 DOPPLER ECHO COMPLETE: CPT | Mod: 26

## 2024-09-24 PROCEDURE — 93010 ELECTROCARDIOGRAM REPORT: CPT

## 2024-09-24 RX ORDER — METOPROLOL TARTRATE 50 MG
100 TABLET ORAL
Qty: 0 | Refills: 0 | DISCHARGE
Start: 2024-09-24

## 2024-09-24 RX ORDER — SACUBITRIL AND VALSARTAN 97; 103 MG/1; MG/1
1 TABLET, FILM COATED ORAL
Refills: 0 | DISCHARGE

## 2024-09-24 RX ORDER — APREMILAST 30 MG
1 KIT ORAL
Refills: 0 | DISCHARGE

## 2024-09-24 RX ORDER — APIXABAN 5 MG/1
1 TABLET, FILM COATED ORAL
Refills: 0 | DISCHARGE

## 2024-09-24 RX ORDER — HYDROCHLOROTHIAZIDE 50 MG/1
1 TABLET ORAL
Refills: 0 | DISCHARGE

## 2024-09-24 RX ORDER — ATORVASTATIN CALCIUM 10 MG/1
1 TABLET, FILM COATED ORAL
Refills: 0 | DISCHARGE

## 2024-09-24 RX ORDER — METOPROLOL TARTRATE 50 MG
2 TABLET ORAL
Refills: 0 | DISCHARGE

## 2024-09-24 RX ORDER — METOPROLOL TARTRATE 50 MG
50 TABLET ORAL
Qty: 0 | Refills: 0 | DISCHARGE
Start: 2024-09-24

## 2024-09-24 RX ADMIN — Medication 100 MILLIEQUIVALENT(S): at 11:39

## 2024-09-24 NOTE — PROGRESS NOTE ADULT - SUBJECTIVE AND OBJECTIVE BOX
Pt doing well s/p uncomplicated VJ & DCCV (200J x 1) with restoration of sinus rhythm. Denies complaint.     VJ: CONCLUSIONS:   1. Left ventricular systolic function is severely decreased with an ejection fraction visually estimated at 20 to 25 %.   2. Moderately enlarged right ventricular cavity size and reduced right ventricular systolic function.   3. Agitated saline injection was negative for intracardiac shunt.   4. Findings were discussed with patient, wife, EP team on 9/24/2024.   5. Mild aortic regurgitation.   6. Moderate to severe mitral regurgitation.   7. Mild tricuspid regurgitation.   8. No evidence of left atrial or left atrial appendage thrombus. The left atrial appendage emptying velocity is low.   9. Small pericardial effusion noted adjacent to the anterior right ventricle and trace pericardial effusion noted adjacent to the right atrium.  10. The patient was successfully cardioverted to sinus rhythm.    Exam:   VS: /90 HR 58 R16 SpO2 98%  General: NAD, Awake and alert   Skin: no erythema/edema/blistering at defib pad sites.   Card: S1/S2, regular   Resp: lungs CTA b/l  Abd: S/NTTP  Ext: no edema     Assessment:   70yo male former smoker with PMH of HTN, HLD, OA, and psoriasis who recently developed fatigue, LOCKHART and worsening SOB x 2 months. He was seen by Cardiologist (Dr. Pina) and was found to be in new onset AF w/ RVR. He was started on Eliquis and Metoprolol. Recent echo and stress performed.  Stress revealed "multiple perfusion defects which demonstrate reversibility or partial reversibility on prone imaging. May be linked to AF induced cardiomyopathy but can not exclude ischemia. Global hypokinesis. EF 28%." TTE revealed EF 20-25%. Dilated RA/LA. Abnormal RV function. Trace AI, mild-mod MI, mild pHTN, mild TI. He presented electively today for and is now status post VJ negative for HAO thrombus and uncomplicated DCCV with restoration of sinus rhythm.     Plan:   Observation on telemetry per post op protocol.    NPO until 15:00  Ambulate w/ assist once fully awake & back to baseline mental status w/ VSS.  Continue Eliquis 5mg twice daily. Importance of strict compliance with anticoagulation regimen reinforced with pt.   Decrease Toprol to 100mg once daily.   Start Entresto twice daily for GDMT.  Resume other home medications.   Anticipate d/c home once all criteria met, with outpt f/up with Dr. Pina and Dr Justice.    Pt doing well s/p uncomplicated VJ & DCCV (200J x 1) with restoration of sinus rhythm. Denies complaint.     EKG: SB at 57bpm. TX 174ms. QRSD 82ms. Twi anteriorly   VJ: CONCLUSIONS:   1. Left ventricular systolic function is severely decreased with an ejection fraction visually estimated at 20 to 25 %.   2. Moderately enlarged right ventricular cavity size and reduced right ventricular systolic function.   3. Agitated saline injection was negative for intracardiac shunt.   4. Findings were discussed with patient, wife, EP team on 9/24/2024.   5. Mild aortic regurgitation.   6. Moderate to severe mitral regurgitation.   7. Mild tricuspid regurgitation.   8. No evidence of left atrial or left atrial appendage thrombus. The left atrial appendage emptying velocity is low.   9. Small pericardial effusion noted adjacent to the anterior right ventricle and trace pericardial effusion noted adjacent to the right atrium.  10. The patient was successfully cardioverted to sinus rhythm.    Exam:   VS: /90 HR 58 R16 SpO2 98%  General: NAD, Awake and alert   Skin: no erythema/edema/blistering at defib pad sites.   Card: S1/S2, regular   Resp: lungs CTA b/l  Abd: S/NTTP  Ext: no edema     Assessment:   70yo male former smoker with PMH of HTN, HLD, OA, and psoriasis who recently developed fatigue, LOCKHART and worsening SOB x 2 months. He was seen by Cardiologist (Dr. Pina) and was found to be in new onset AF w/ RVR. He was started on Eliquis and Metoprolol. Recent echo and stress performed.  Stress revealed "multiple perfusion defects which demonstrate reversibility or partial reversibility on prone imaging. May be linked to AF induced cardiomyopathy but can not exclude ischemia. Global hypokinesis. EF 28%." TTE revealed EF 20-25%. Dilated RA/LA. Abnormal RV function. Trace AI, mild-mod MI, mild pHTN, mild TI. He presented electively today for and is now status post VJ negative for HAO thrombus and uncomplicated DCCV with restoration of sinus rhythm.     Plan:   Observation on telemetry per post op protocol.    NPO until 15:00  Ambulate w/ assist once fully awake & back to baseline mental status w/ VSS.  Continue Eliquis 5mg twice daily. Importance of strict compliance with anticoagulation regimen reinforced with pt.   Decrease Toprol to 100mg once daily.   Start Entresto twice daily for GDMT.  Resume other home medications.   Anticipate d/c home once all criteria met, with outpt f/up with Dr. Pina and Dr Justice.   Above d/w Dr. Pina.    Pt doing well s/p uncomplicated VJ & DCCV (200J x 1) with restoration of sinus rhythm. Denies complaint.     EKG: SB at 57bpm. W[place] 174ms. QRSD 82ms. Twi anteriorly   VJ: CONCLUSIONS:   1. Left ventricular systolic function is severely decreased with an ejection fraction visually estimated at 20 to 25 %.   2. Moderately enlarged right ventricular cavity size and reduced right ventricular systolic function.   3. Agitated saline injection was negative for intracardiac shunt.   4. Findings were discussed with patient, wife, EP team on 9/24/2024.   5. Mild aortic regurgitation.   6. Moderate to severe mitral regurgitation.   7. Mild tricuspid regurgitation.   8. No evidence of left atrial or left atrial appendage thrombus. The left atrial appendage emptying velocity is low.   9. Small pericardial effusion noted adjacent to the anterior right ventricle and trace pericardial effusion noted adjacent to the right atrium.  10. The patient was successfully cardioverted to sinus rhythm.    Exam:   VS: /90 HR 58 R16 SpO2 98%  General: NAD, Awake and alert   Skin: no erythema/edema/blistering at defib pad sites.   Card: S1/S2, regular   Resp: lungs CTA b/l  Abd: S/NTTP  Ext: no edema     Assessment:   70yo male former smoker with PMH of HTN, HLD, OA, and psoriasis who recently developed fatigue, LOCKHART and worsening SOB x 2 months. He was seen by Cardiologist (Dr. Pina) and was found to be in new onset AF w/ RVR. He was started on Eliquis and Metoprolol. Recent echo and stress performed.  Stress revealed "multiple perfusion defects which demonstrate reversibility or partial reversibility on prone imaging. May be linked to AF induced cardiomyopathy but can not exclude ischemia. Global hypokinesis. EF 28%." TTE revealed EF 20-25%. Dilated RA/LA. Abnormal RV function. Trace AI, mild-mod MI, mild pHTN, mild TI. He presented electively today for and is now status post VJ negative for HAO thrombus and uncomplicated DCCV with restoration of sinus rhythm.     Plan:   Observation on telemetry per post op protocol.    NPO until 15:00  Ambulate w/ assist once fully awake & back to baseline mental status w/ VSS.  Continue Eliquis 5mg twice daily. Importance of strict compliance with anticoagulation regimen reinforced with pt.   Decrease Toprol to 100mg once daily.   Continue Entresto twice daily for GDMT.  Resume other home medications.   Anticipate d/c home once all criteria met, with outpt f/up with Dr. Pina and Dr Justice.   Above d/w Dr. Pina.    Pt doing well s/p uncomplicated VJ & DCCV (200J x 1) with restoration of sinus rhythm. Denies complaint.     EKG: SB at 57bpm. VT 174ms. QRSD 82ms. Twi anteriorly   VJ: CONCLUSIONS:   1. Left ventricular systolic function is severely decreased with an ejection fraction visually estimated at 20 to 25 %.   2. Moderately enlarged right ventricular cavity size and reduced right ventricular systolic function.   3. Agitated saline injection was negative for intracardiac shunt.   4. Findings were discussed with patient, wife, EP team on 9/24/2024.   5. Mild aortic regurgitation.   6. Moderate to severe mitral regurgitation.   7. Mild tricuspid regurgitation.   8. No evidence of left atrial or left atrial appendage thrombus. The left atrial appendage emptying velocity is low.   9. Small pericardial effusion noted adjacent to the anterior right ventricle and trace pericardial effusion noted adjacent to the right atrium.  10. The patient was successfully cardioverted to sinus rhythm.    Exam:   VS: /90 HR 58 R16 SpO2 98%  General: NAD, Awake and alert   Skin: no erythema/edema/blistering at defib pad sites.   Card: S1/S2, regular   Resp: lungs CTA b/l  Abd: S/NTTP  Ext: no edema     Assessment:   68yo male former smoker with PMH of HTN, HLD, OA, and psoriasis who recently developed fatigue, LOCKHART and worsening SOB x 2 months. He was seen by Cardiologist (Dr. Pina) and was found to be in new onset AF w/ RVR. He was started on Eliquis and Metoprolol. Recent echo and stress performed.  Stress revealed "multiple perfusion defects which demonstrate reversibility or partial reversibility on prone imaging. May be linked to AF induced cardiomyopathy but can not exclude ischemia. Global hypokinesis. EF 28%." TTE revealed EF 20-25%. Dilated RA/LA. Abnormal RV function. Trace AI, mild-mod MI, mild pHTN, mild TI. He presented electively today for and is now status post VJ negative for HAO thrombus and uncomplicated DCCV with restoration of sinus rhythm.     Plan:   Observation on telemetry per post op protocol.    NPO until 15:00  Ambulate w/ assist once fully awake & back to baseline mental status w/ VSS.  Continue Eliquis 5mg twice daily. Importance of strict compliance with anticoagulation regimen reinforced with pt.   Decrease Toprol to 50mg once daily.   Continue Entresto twice daily for GDMT.  Resume other home medications.   Anticipate d/c home once all criteria met, with outpt f/up with Dr. Pina and Dr Justice.   Above d/w Dr. Pina.    Pt doing well s/p uncomplicated VJ & DCCV (200J x 1) with restoration of sinus rhythm. Denies complaint.     EKG: SB at 57bpm. MS 174ms. QRSD 82ms. Twi anteriorly   VJ: CONCLUSIONS:   1. Left ventricular systolic function is severely decreased with an ejection fraction visually estimated at 20 to 25 %.   2. Moderately enlarged right ventricular cavity size and reduced right ventricular systolic function.   3. Agitated saline injection was negative for intracardiac shunt.   4. Findings were discussed with patient, wife, EP team on 9/24/2024.   5. Mild aortic regurgitation.   6. Moderate to severe mitral regurgitation.   7. Mild tricuspid regurgitation.   8. No evidence of left atrial or left atrial appendage thrombus. The left atrial appendage emptying velocity is low.   9. Small pericardial effusion noted adjacent to the anterior right ventricle and trace pericardial effusion noted adjacent to the right atrium.  10. The patient was successfully cardioverted to sinus rhythm.    Exam:   VS: /90 HR 58 R16 SpO2 98%  General: NAD, Awake and alert   Skin: no erythema/edema/blistering at defib pad sites.   Card: S1/S2, regular   Resp: lungs CTA b/l  Abd: S/NTTP  Ext: no edema     Assessment:   70yo male former smoker with PMH of HTN, HLD, OA, and psoriasis who recently developed fatigue, LOCKHART and worsening SOB x 2 months. He was seen by Cardiologist (Dr. Pina) and was found to be in new onset AF w/ RVR. He was started on Eliquis and Metoprolol. Recent echo and stress performed.  Stress revealed "multiple perfusion defects which demonstrate reversibility or partial reversibility on prone imaging. May be linked to AF induced cardiomyopathy but can not exclude ischemia. Global hypokinesis. EF 28%." TTE revealed EF 20-25%. Dilated RA/LA. Abnormal RV function. Trace AI, mild-mod MI, mild pHTN, mild TI. He presented electively today for and is now status post VJ negative for HAO thrombus and uncomplicated DCCV with restoration of sinus rhythm.     Plan:   Observation on telemetry per post op protocol.    NPO until 15:00  Ambulate w/ assist once fully awake & back to baseline mental status w/ VSS.  Continue Eliquis 5mg twice daily. Importance of strict compliance with anticoagulation regimen reinforced with pt.   Decrease Toprol to 100mg once daily.   Continue Entresto twice daily for GDMT.  Resume other home medications.   Anticipate d/c home once all criteria met, with outpt f/up with Dr. Pina and Dr Justice.   Above d/w Dr. Pina.

## 2024-09-24 NOTE — H&P PST ADULT - COMMENTS
Denies any fevers, chills, CP, palp, syncope, abdominal pain, N/V/D, hematuria, bloody nad/dark stools    SOB, LOCKHART, dizziness Denies any fevers, chills, CP, palp, syncope, abdominal pain, N/V/D, hematuria, bloody and/or dark stools    SOB, LOCKHART, dizziness

## 2024-09-24 NOTE — DISCHARGE NOTE NURSING/CASE MANAGEMENT/SOCIAL WORK - PATIENT PORTAL LINK FT
You can access the FollowMyHealth Patient Portal offered by [facility] by registering at the following website: [email or web address]. By joining [facility]’s FollowMyHealth portal, you will also be able to view your health information using other applications (apps) compatible with our system.

## 2024-09-24 NOTE — H&P PST ADULT - NSICDXPASTMEDICALHX_GEN_ALL_CORE_FT
PAST MEDICAL HISTORY:  HLD (hyperlipidemia)     HTN (hypertension)     OA (osteoarthritis)     Psoriasis     Unspecified atrial fibrillation

## 2024-09-24 NOTE — DISCHARGE NOTE PROVIDER - NSDCFUADDAPPT_GEN_ALL_CORE_FT
Follow up with Michelle Feng NP and Dr. Justice at [facility] in 2-4 weeks. Our office will contact you in 3-5 days to schedule this appointment. Please call [phone] with questions or concerns. Follow up with Dr. Justice at [facility]. Our office will contact you in 3-5 days to schedule this appointment. Please call [phone] with questions or concerns.  Continue Eliquis 5mg twice daily without interruptions.  Decrease Toprol to ****  Start Entresto twice daily.  Follow up with Dr. Pina (Cardiology) and Dr. Justice (Electrophysiology)  Continue other medications as usual.  Follow up with Dr. Justice at [facility]. Our office will contact you in 3-5 days to schedule this appointment. Please call [phone] with questions or concerns.  Continue Eliquis 5mg twice daily without interruptions.  Decrease Toprol to ****  Follow up with Dr. Pina (Cardiology) and Dr. Justice (Electrophysiology)  Continue other medications as usual.  Follow up with Dr. Justice at [facility]. Our office will contact you in 3-5 days to schedule this appointment. Please call [phone] with questions or concerns.  Continue Eliquis 5mg twice daily without interruptions.  Decrease Toprol to 50mg once daily.   Follow up with Dr. Pina (Cardiology) and Dr. Justice (Electrophysiology)  Continue other medications as usual.  Follow up with Dr. Justice at [facility]. Our office will contact you in 3-5 days to schedule this appointment. Please call [phone] with questions or concerns.  Continue Eliquis 5mg twice daily without interruptions.  Decrease Toprol to 100mg once daily.   Follow up with Dr. Pina (Cardiology) and Dr. Justice (Electrophysiology)  Continue other medications as usual.

## 2024-09-24 NOTE — H&P PST ADULT - NSICDXPASTSURGICALHX_GEN_ALL_CORE_FT
PAST SURGICAL HISTORY:  No significant past surgical history      PAST SURGICAL HISTORY:  H/O elbow surgery     History of hip surgery

## 2024-09-24 NOTE — DISCHARGE NOTE PROVIDER - HOSPITAL COURSE
68yo male former smoker with PMH of HTN, HLD, OA, and psoriasis who recently developed fatigue, LOCKHART and worsening SOB x 2 months. He was seen by Cardiologist (Dr. Pina) and was found to be in new onset AF w/ RVR. He was started on Eliquis and Metoprolol. Recent echo and stress performed.  Stress revealed "multiple perfusion defects which demonstrate reversibility or partial reversibility on prone imaging. May be linked to AF induced cardiomyopathy but can not exclude ischemia. Global hypokinesis. EF 28%." TTE revealed EF 20-25%. Dilated RA/LA. Abnormal RV function. Trace AI, mild-mod MI, mild pHTN, mild TI. He presented electively today for and is now status post VJ negative for HAO thrombus and uncomplicated DCCV with restoration of sinus rhythm.

## 2024-09-24 NOTE — DISCHARGE NOTE PROVIDER - CARE PROVIDER_API CALL
Bernabe Pian  Cardiovascular Disease  [address], [address]  [place] [address]  Phone: [phone]  Fax: [phone]  Follow Up Time:     Stephen Justice  Cardiac Electrophysiology  [address] [place] [id number]  Phone: [phone]  Fax: [phone]  Follow Up Time:

## 2024-09-24 NOTE — DISCHARGE NOTE NURSING/CASE MANAGEMENT/SOCIAL WORK - NSDCVIVACCINE_GEN_ALL_CORE_FT
Tdap; 14-Jan-2017 15:48; Ivis Valenzuela (RN); [employer]; x4063cr; IntraMuscular; Deltoid Right.; 0.5 milliLiter(s); VIS (VIS Published: 09-May-2013, VIS Presented: 14-Jan-2017);

## 2024-09-24 NOTE — DISCHARGE NOTE NURSING/CASE MANAGEMENT/SOCIAL WORK - NSDCFUADDAPPT_GEN_ALL_CORE_FT
Follow up with Dr. Justice at [facility]. Our office will contact you in 3-5 days to schedule this appointment. Please call [phone] with questions or concerns.  Continue Eliquis 5mg twice daily without interruptions.  Decrease Toprol to 100mg once daily.   Follow up with Dr. Pina (Cardiology) and Dr. Justice (Electrophysiology)  Continue other medications as usual.

## 2024-09-24 NOTE — DISCHARGE NOTE PROVIDER - CARE PROVIDERS DIRECT ADDRESSES
[email or web address]

## 2024-09-24 NOTE — H&P PST ADULT - HISTORY OF PRESENT ILLNESS
70yo male former smoker with PMH of HTN, HLD, OA, and psoriasis who recently developed  LOCKHART and worsening SOB x 2 months. He was seen by Cardiologist (Dr. Pina) and was found to be in new onset AF w/ RVR. He was started on Eliquis and Metoprolol. An echo and stress test was scheduled for Friday 9/20. He now presents electively for a VJ guided cardioversion.       Cardiology summary:  EKG 9/18/2024; AF w/ RVR. HR 125bpm. PRWP. Nonspecific ST-T changes 70yo male former smoker with PMH of HTN, HLD, OA, and psoriasis who recently developed fatigue, LOCKHART and worsening SOB x 2 months. He was seen by Cardiologist (Dr. Pina) and was found to be in new onset AF w/ RVR. He was started on Eliquis and Metoprolol. An echo and stress test was scheduled for Friday 9/20. He now presents electively for a VJ guided cardioversion.     Cardiology summary:  EKG 9/18/2024; AF w/ RVR. HR 125bpm. PRWP. Nonspecific ST-T changes 68yo male former smoker with PMH of HTN, HLD, OA, and psoriasis who recently developed fatigue, LOCKHART and worsening SOB x 2 months. He was seen by Cardiologist (Dr. Pina) and was found to be in new onset AF w/ RVR. He was started on Eliquis and Metoprolol. An echo and stress test was scheduled for Friday 9/20. He now presents electively for a VJ guided cardioversion.     Cardiology summary:  EKG 9/18/2024; AF w/ RVR. HR 125bpm. PRWP. Nonspecific ST-T changes  Stess test 9/20/2024; Equivocal study and multiple perfusion defects which demonstrate reversibility or partial reversibility on prone imaging. May be linked to AF induced cardiomyopathy but can not exclude ischemia. Global hypokinesis. EF 28%. Appropriate response to chemical agent and stress induced occasional PVCs, couplets and one episode of a 4 beat WCT  TTE 9/18/2024; EF 20-25%. Dilated RA/LA. Abnormal RV function. Trace AI, mild-mod MI, mild pHTN, mild TI

## 2024-09-24 NOTE — H&P PST ADULT - ASSESSMENT
NPO confirmed  Last Eliquis dose this morning  68yo male former smoker with PMH of HTN, HLD, OA, and psoriasis who recently developed fatigue, LOCKHART and worsening SOB x 2 months. He was seen by Cardiologist (Dr. Pina) and was found to be in new onset AF w/ RVR. He was started on Eliquis and Metoprolol. An echo and stress test was scheduled for Friday 9/20. He now presents electively for a VJ guided cardioversion.     Plan:  Consent w/ Attending  Stat Labs & ECG  NPO confirmed  Last Eliquis dose this morning

## 2024-09-24 NOTE — DISCHARGE NOTE PROVIDER - NSDCMRMEDTOKEN_GEN_ALL_CORE_FT
atorvastatin 20 mg oral tablet: 1 tab(s) orally once a day (at bedtime)  Eliquis 5 mg oral tablet: 1 tab(s) orally 2 times a day  Entresto 24 mg-26 mg oral tablet: 1 tab(s) orally 2 times a day  hydroCHLOROthiazide 12.5 mg oral capsule: 1 cap(s) orally once a day  metoprolol succinate 50 mg oral capsule, extended release: 2 cap(s) orally 3 times a day  Otezla 30 mg oral tablet: 1 tab(s) orally 2 times a day   atorvastatin 20 mg oral tablet: 1 tab(s) orally once a day (at bedtime)  Eliquis 5 mg oral tablet: 1 tab(s) orally 2 times a day  Entresto 24 mg-26 mg oral tablet: 1 tab(s) orally 2 times a day  hydroCHLOROthiazide 12.5 mg oral capsule: 1 cap(s) orally once a day  metoprolol succinate 50 mg oral capsule, extended release: 50 milligram(s) orally once a day  Otezla 30 mg oral tablet: 1 tab(s) orally 2 times a day   atorvastatin 20 mg oral tablet: 1 tab(s) orally once a day (at bedtime)  Eliquis 5 mg oral tablet: 1 tab(s) orally 2 times a day  Entresto 24 mg-26 mg oral tablet: 1 tab(s) orally 2 times a day  hydroCHLOROthiazide 12.5 mg oral capsule: 1 cap(s) orally once a day  metoprolol succinate 100 mg oral capsule, extended release: 100 milligram(s) orally once a day  Otezla 30 mg oral tablet: 1 tab(s) orally 2 times a day

## 2024-10-28 PROBLEM — L40.9 PSORIASIS, UNSPECIFIED: Chronic | Status: ACTIVE | Noted: 2024-09-24

## 2024-10-28 PROBLEM — M19.90 UNSPECIFIED OSTEOARTHRITIS, UNSPECIFIED SITE: Chronic | Status: ACTIVE | Noted: 2024-09-24

## 2024-10-28 PROBLEM — E78.5 HYPERLIPIDEMIA, UNSPECIFIED: Chronic | Status: ACTIVE | Noted: 2024-09-24

## 2024-10-28 PROBLEM — I48.91 UNSPECIFIED ATRIAL FIBRILLATION: Chronic | Status: ACTIVE | Noted: 2024-09-24

## 2024-10-28 PROBLEM — I10 ESSENTIAL (PRIMARY) HYPERTENSION: Chronic | Status: ACTIVE | Noted: 2024-09-24

## 2024-10-30 ENCOUNTER — APPOINTMENT (OUTPATIENT)
Dept: ELECTROPHYSIOLOGY | Facility: CLINIC | Age: 69
End: 2024-10-30
Payer: COMMERCIAL

## 2024-10-30 VITALS
HEART RATE: 160 BPM | BODY MASS INDEX: 28.56 KG/M2 | SYSTOLIC BLOOD PRESSURE: 132 MMHG | WEIGHT: 204 LBS | HEIGHT: 71 IN | DIASTOLIC BLOOD PRESSURE: 74 MMHG

## 2024-10-30 DIAGNOSIS — I48.19 OTHER PERSISTENT ATRIAL FIBRILLATION: ICD-10-CM

## 2024-10-30 PROCEDURE — 99215 OFFICE O/P EST HI 40 MIN: CPT

## 2024-10-30 PROCEDURE — 93000 ELECTROCARDIOGRAM COMPLETE: CPT

## 2024-11-04 ENCOUNTER — INPATIENT (INPATIENT)
Facility: HOSPITAL | Age: 69
LOS: 0 days | Discharge: HOME | DRG: 310 | End: 2024-11-05
Attending: STUDENT IN AN ORGANIZED HEALTH CARE EDUCATION/TRAINING PROGRAM | Admitting: STUDENT IN AN ORGANIZED HEALTH CARE EDUCATION/TRAINING PROGRAM
Payer: COMMERCIAL

## 2024-11-04 ENCOUNTER — TRANSCRIPTION ENCOUNTER (OUTPATIENT)
Age: 69
End: 2024-11-04

## 2024-11-04 VITALS
HEIGHT: 71 IN | TEMPERATURE: 98 F | RESPIRATION RATE: 16 BRPM | WEIGHT: 195.11 LBS | HEART RATE: 140 BPM | SYSTOLIC BLOOD PRESSURE: 133 MMHG | OXYGEN SATURATION: 97 % | DIASTOLIC BLOOD PRESSURE: 102 MMHG

## 2024-11-04 DIAGNOSIS — I48.91 UNSPECIFIED ATRIAL FIBRILLATION: ICD-10-CM

## 2024-11-04 DIAGNOSIS — Z98.890 OTHER SPECIFIED POSTPROCEDURAL STATES: Chronic | ICD-10-CM

## 2024-11-04 LAB
ABO RH CONFIRMATION: SIGNIFICANT CHANGE UP
ANION GAP SERPL CALC-SCNC: 12 MMOL/L — SIGNIFICANT CHANGE UP (ref 5–17)
APTT BLD: 35.5 SEC — SIGNIFICANT CHANGE UP (ref 24.5–35.6)
BLD GP AB SCN SERPL QL: SIGNIFICANT CHANGE UP
BUN SERPL-MCNC: 22.3 MG/DL — HIGH (ref 8–20)
CALCIUM SERPL-MCNC: 9.6 MG/DL — SIGNIFICANT CHANGE UP (ref 8.4–10.5)
CHLORIDE SERPL-SCNC: 102 MMOL/L — SIGNIFICANT CHANGE UP (ref 96–108)
CO2 SERPL-SCNC: 25 MMOL/L — SIGNIFICANT CHANGE UP (ref 22–29)
CREAT SERPL-MCNC: 1.33 MG/DL — HIGH (ref 0.5–1.3)
EGFR: 57 ML/MIN/1.73M2 — LOW
GLUCOSE SERPL-MCNC: 139 MG/DL — HIGH (ref 70–99)
HCT VFR BLD CALC: 47 % — SIGNIFICANT CHANGE UP (ref 39–50)
HGB BLD-MCNC: 15 G/DL — SIGNIFICANT CHANGE UP (ref 13–17)
INR BLD: 1.21 RATIO — HIGH (ref 0.85–1.16)
MCHC RBC-ENTMCNC: 28.2 PG — SIGNIFICANT CHANGE UP (ref 27–34)
MCHC RBC-ENTMCNC: 31.9 G/DL — LOW (ref 32–36)
MCV RBC AUTO: 88.5 FL — SIGNIFICANT CHANGE UP (ref 80–100)
PLATELET # BLD AUTO: 278 K/UL — SIGNIFICANT CHANGE UP (ref 150–400)
POTASSIUM SERPL-MCNC: 4.5 MMOL/L — SIGNIFICANT CHANGE UP (ref 3.5–5.3)
POTASSIUM SERPL-SCNC: 4.5 MMOL/L — SIGNIFICANT CHANGE UP (ref 3.5–5.3)
PROTHROM AB SERPL-ACNC: 13.6 SEC — HIGH (ref 9.9–13.4)
RBC # BLD: 5.31 M/UL — SIGNIFICANT CHANGE UP (ref 4.2–5.8)
RBC # FLD: 14.5 % — SIGNIFICANT CHANGE UP (ref 10.3–14.5)
SODIUM SERPL-SCNC: 139 MMOL/L — SIGNIFICANT CHANGE UP (ref 135–145)
WBC # BLD: 9.12 K/UL — SIGNIFICANT CHANGE UP (ref 3.8–10.5)
WBC # FLD AUTO: 9.12 K/UL — SIGNIFICANT CHANGE UP (ref 3.8–10.5)

## 2024-11-04 PROCEDURE — 93656 COMPRE EP EVAL ABLTJ ATR FIB: CPT

## 2024-11-04 PROCEDURE — 93010 ELECTROCARDIOGRAM REPORT: CPT | Mod: 76

## 2024-11-04 PROCEDURE — 93657 TX L/R ATRIAL FIB ADDL: CPT

## 2024-11-04 RX ORDER — ALPRAZOLAM 0.25 MG
0.25 TABLET ORAL EVERY 6 HOURS
Refills: 0 | Status: DISCONTINUED | OUTPATIENT
Start: 2024-11-04 | End: 2024-11-05

## 2024-11-04 RX ORDER — AMIODARONE HCL 200 MG
1 TABLET ORAL
Refills: 0 | DISCHARGE

## 2024-11-04 RX ORDER — SACUBITRIL AND VALSARTAN 97; 103 MG/1; MG/1
1 TABLET, FILM COATED ORAL
Refills: 0 | Status: DISCONTINUED | OUTPATIENT
Start: 2024-11-05 | End: 2024-11-05

## 2024-11-04 RX ORDER — ACETAMINOPHEN 500 MG
650 TABLET ORAL EVERY 6 HOURS
Refills: 0 | Status: DISCONTINUED | OUTPATIENT
Start: 2024-11-04 | End: 2024-11-05

## 2024-11-04 RX ORDER — OXYCODONE HYDROCHLORIDE 30 MG/1
5 TABLET ORAL EVERY 6 HOURS
Refills: 0 | Status: DISCONTINUED | OUTPATIENT
Start: 2024-11-04 | End: 2024-11-05

## 2024-11-04 RX ORDER — SODIUM CHLORIDE 9 MG/ML
1000 INJECTION, SOLUTION INTRAMUSCULAR; INTRAVENOUS; SUBCUTANEOUS
Refills: 0 | Status: DISCONTINUED | OUTPATIENT
Start: 2024-11-04 | End: 2024-11-05

## 2024-11-04 RX ORDER — METOPROLOL TARTRATE 50 MG
100 TABLET ORAL DAILY
Refills: 0 | Status: DISCONTINUED | OUTPATIENT
Start: 2024-11-04 | End: 2024-11-05

## 2024-11-04 RX ORDER — ONDANSETRON HYDROCHLORIDE 2 MG/ML
4 INJECTION, SOLUTION INTRAMUSCULAR; INTRAVENOUS EVERY 4 HOURS
Refills: 0 | Status: DISCONTINUED | OUTPATIENT
Start: 2024-11-04 | End: 2024-11-05

## 2024-11-04 RX ORDER — APIXABAN 5 MG/1
5 TABLET, FILM COATED ORAL
Refills: 0 | Status: DISCONTINUED | OUTPATIENT
Start: 2024-11-04 | End: 2024-11-05

## 2024-11-04 RX ORDER — MAGNESIUM, ALUMINUM HYDROXIDE 200-200 MG
30 TABLET,CHEWABLE ORAL EVERY 4 HOURS
Refills: 0 | Status: DISCONTINUED | OUTPATIENT
Start: 2024-11-04 | End: 2024-11-05

## 2024-11-04 RX ADMIN — Medication 1 LOZENGE: at 17:22

## 2024-11-04 RX ADMIN — APIXABAN 5 MILLIGRAM(S): 5 TABLET, FILM COATED ORAL at 16:27

## 2024-11-04 RX ADMIN — Medication 20 MILLIGRAM(S): at 21:55

## 2024-11-04 RX ADMIN — SODIUM CHLORIDE 50 MILLILITER(S): 9 INJECTION, SOLUTION INTRAMUSCULAR; INTRAVENOUS; SUBCUTANEOUS at 14:36

## 2024-11-04 RX ADMIN — Medication 1 LOZENGE: at 21:55

## 2024-11-04 RX ADMIN — SODIUM CHLORIDE 50 MILLILITER(S): 9 INJECTION, SOLUTION INTRAMUSCULAR; INTRAVENOUS; SUBCUTANEOUS at 17:23

## 2024-11-04 RX ADMIN — SODIUM CHLORIDE 50 MILLILITER(S): 9 INJECTION, SOLUTION INTRAMUSCULAR; INTRAVENOUS; SUBCUTANEOUS at 21:55

## 2024-11-04 NOTE — PROGRESS NOTE ADULT - SUBJECTIVE AND OBJECTIVE BOX
Admission Criteria  Please admit the patient to the following service: CARDIOLOGY      Major Criteria:    - LV dysfunction (EF<30%)  - Continuous EKG monitoring is required for condition causing arrhythmia (hyperkalemia, etc)  - Significant volume load > 200 ml    Admit to: 3GUL     Patient is being admitted to the inpatient service due to high risk characteristics and need for further management/monitoring and is considered to be at a significantly increased risk of major adverse cardiac and vascular events if discharged.

## 2024-11-04 NOTE — H&P PST ADULT - HISTORY OF PRESENT ILLNESS
69 year old gentleman with history of HTN, OA, psoriatic arthritis, HLA B27 positive, and persistent atrial fibrillation with associated cardiomyopathy (LVEF 20-25%) s/p urbina ccessful VJ/DCCV (9/24/24) who developed recurrent symptomatic AF shortly after cardioversion and now presents for AF ablation with Dr. Justice. Was started on Amiodarone on prior to ablation for temporary rate and possibly rhythm control (started 10/30/24 - amiodarone 200mg TID x 2 weeks, then reduce to 200mg qd).  Also on Toprol 100mg BID and Eliquis 5mg BID - denies bleeding complications.       CARDIOLOGY SUMMARY  ECG 11/4/2024:   ECG: 10/30/24: afib 160bpm    Remote/Ambulatory Rhythm Monitoring: Holter 9/4 - 9/6/24: AF (avg 128 bpm, range  bpm), NSVT up to 3 beats    Stress Test: Stress 9/20/24 multiple perfusion defects which showed reversibility or partial reversibility with prone images. LVF 28% and severe global HK. May be linked to AF induce CMP, cannot rule out ischemia    Echo: VJ 9/24/24 LVEF 20-25%, mod enlarged RV, mild AI, mod-severe MR, mild TR, no LA thrombus, small pericardia effusion adjacent t anterior RV and trace noted posterior to RA  ?  TTE 9/18/24 LVF 20-25%, LA 5.1cm, septal flattening in diastol and systle c/w RV pressure and volume overload. Akinesis of septum. , trace AI, mild-mod MR, trivial PI, mild TR, RVSP 37-42mmHg  ? 69 year old gentleman with history of HTN, OA, psoriatic arthritis, HLA B27 positive, and persistent atrial fibrillation with associated cardiomyopathy (LVEF 20-25%) s/p urbina ccessful VJ/DCCV (9/24/24) who developed recurrent symptomatic AF shortly after cardioversion and now presents for AF ablation with Dr. Justice. Was started on Amiodarone on prior to ablation for temporary rate and possibly rhythm control (started 10/30/24 - recommended amiodarone 200mg TID x 2 weeks but only took TID x 1 day before switching to 200mg qd).  Also on Toprol 100mg BID and Eliquis 5mg BID - denies bleeding complications.       CARDIOLOGY SUMMARY  ECG 11/4/2024: AF with RVR, narrow QRS  ECG: 10/30/24: afib 160bpm    Remote/Ambulatory Rhythm Monitoring: Holter 9/4 - 9/6/24: AF (avg 128 bpm, range  bpm), NSVT up to 3 beats    Stress Test: Stress 9/20/24 multiple perfusion defects which showed reversibility or partial reversibility with prone images. LVF 28% and severe global HK. May be linked to AF induce CMP, cannot rule out ischemia    Echo: VJ 9/24/24 LVEF 20-25%, mod enlarged RV, mild AI, mod-severe MR, mild TR, no LA thrombus, small pericardia effusion adjacent t anterior RV and trace noted posterior to RA  ?  TTE 9/18/24 LVF 20-25%, LA 5.1cm, septal flattening in diastol and systle c/w RV pressure and volume overload. Akinesis of septum. , trace AI, mild-mod MR, trivial PI, mild TR, RVSP 37-42mmHg  ?

## 2024-11-04 NOTE — H&P PST ADULT - NSICDXPASTMEDICALHX_GEN_ALL_CORE_FT
PAST MEDICAL HISTORY:  HLD (hyperlipidemia)     HTN (hypertension)     OA (osteoarthritis)     Persistent atrial fibrillation     Psoriasis     Severe left ventricular systolic dysfunction (LVSD)     Unspecified atrial fibrillation

## 2024-11-04 NOTE — DISCHARGE NOTE PROVIDER - HOSPITAL COURSE
69 year old gentleman with history of HTN, OA, psoriatic arthritis, HLA B27 positive, and persistent atrial fibrillation with associated cardiomyopathy (LVEF 20-25%) s/p successful VJ/DCCV (9/24/24) who developed recurrent symptomatic AF w/ RVR shortly after cardioversion.   Now status post uncomplicated pulsed field ablation of atrial fibrillation (PVI, PWI) via b/l femoral venous access. Hemostasis achieved with b/l FO8 suture. The patient was observed overnight without event and was discharged home the following morning with a plan for outpatient follow up.

## 2024-11-04 NOTE — DISCHARGE NOTE PROVIDER - NSDCMRMEDTOKEN_GEN_ALL_CORE_FT
amiodarone 200 mg oral tablet: 1 tab(s) orally once a day  atorvastatin 20 mg oral tablet: 1 tab(s) orally once a day (at bedtime)  Eliquis 5 mg oral tablet: 1 tab(s) orally 2 times a day  Entresto 24 mg-26 mg oral tablet: 1 tab(s) orally 2 times a day  metoprolol succinate 100 mg oral capsule, extended release: 100 milligram(s) orally 2 times a day  Otezla 30 mg oral tablet: 1 tab(s) orally 2 times a day   atorvastatin 20 mg oral tablet: 1 tab(s) orally once a day (at bedtime)  Eliquis 5 mg oral tablet: 1 tab(s) orally 2 times a day  Entresto 24 mg-26 mg oral tablet: 1 tab(s) orally 2 times a day  metoprolol succinate 100 mg oral tablet, extended release: 1 tab(s) orally once a day  Otezla 30 mg oral tablet: 1 tab(s) orally 2 times a day  pantoprazole 40 mg oral delayed release tablet: 1 tab(s) orally once a day

## 2024-11-04 NOTE — DISCHARGE NOTE PROVIDER - NSDCFUADDINST_GEN_ALL_CORE_FT
Discontinue Amiodarone.  Decrease Metoprolol to 100mg ONCE daily.  Continue Eliquis 5mg BID  Follow up with Dr. Justice/Michelle Verma NP at [facility] in 2-4 weeks. Our office will contact you in 3-5 days to schedule this appointment. Please call [phone] with questions or concerns. Discontinue Amiodarone.  Decrease Metoprolol to 100mg ONCE daily.  Continue Eliquis 5mg every 12 hours  Follow up with Dr. Justice/Michelle Verma NP at [facility] in 2-4 weeks. Our office will contact you in 3-5 days to schedule this appointment. Please call [phone] with questions or concerns.

## 2024-11-04 NOTE — DISCHARGE NOTE PROVIDER - PROVIDER TOKENS
PROVIDER:[TOKEN:[[id number]],FOLLOWUP:[1 month]],PROVIDER:[TOKEN:[[id number]],FOLLOWUP:[Routine]]

## 2024-11-04 NOTE — DISCHARGE NOTE PROVIDER - CARE PROVIDERS DIRECT ADDRESSES
[email or web address]

## 2024-11-04 NOTE — DISCHARGE NOTE PROVIDER - NSDCCPTREATMENT_GEN_ALL_CORE_FT
PRINCIPAL PROCEDURE  Procedure: Intracardiac catheter ablation for atrial fibrillation  Findings and Treatment: - Bruising at the groin, sometimes extending down the leg, and/or a small lump under the skin at the groin access site is normal and will resolve within 2 – 3 weeks.   - Occasional skipped beats or palpitations that last for a few beats are common and generally resolve within 1-2 months.   - You may walk and take stairs at a regular pace.   - Do not perform any exercise more strenuous than walking for 1 week.   - Do not strain or lift heavy objects for 1 week.  - You may shower the day after the procedure.  - Do not soak in water (such as tub baths, hot tubs, swimming, etc.) for 1 week.   - You may resume all other activities the day after the procedure.  Call your doctor if:   - you notice bleeding, redness, drainage, swelling, increased tenderness or a hot sensation around the catheter insertion site.   - your temperature is greater than 100 degrees F for more than 24 hours.  - your rapid heart rhythm returns.  - you have any questions or concerns regarding the procedure.  If significant bleeding and/or a large lump (the size of a golf ball or bigger) occurs:  - Lie flat and apply continuous direct pressure just above the puncture site for at least 10 minutes  - If the issue resolves, notify your physician immediately.    - If the bleeding cannot be controlled, please seek immediate medical attention.  If you experience increased difficulty breathing or chest pain, or if you faint or have dizzy spells, please seek immediate medical attention.      
Malnutrition...

## 2024-11-04 NOTE — H&P PST ADULT - ASSESSMENT
69 year old gentleman with history of HTN, OA, psoriatic arthritis, HLA B27 positive, and persistent atrial fibrillation with associated cardiomyopathy (LVEF 20-25%) s/p urbina ccessful VJ/DCCV (9/24/24) who developed recurrent symptomatic AF shortly after cardioversion and now presents for AF ablation with Dr. Justice. Was started on Amiodarone on prior to ablation for temporary rate and possibly rhythm control (started 10/30/24 - recommended amiodarone 200mg TID x 2 weeks but only took TID x 1 day before switching to 200mg qd).  Also on Toprol 100mg BID and Eliquis 5mg BID - denies bleeding complications.     Confirms NPO > 8 hours.  Last dose of Eliquis 5mg BID was yesterday evening  Last dose of Entresto was Friday (11/1)    - IV heparin lock  - STAT labs and type and screen  - 2 units of PRBC On hold  - Consent to be obtained by MERYL MOCK  - Maintain NPO status

## 2024-11-04 NOTE — PROGRESS NOTE ADULT - SUBJECTIVE AND OBJECTIVE BOX
PROCEDURE(S): Pulsed Field Ablation of Atrial Fibrillation    ELECTRPHYSIOLOGIST(S): Stephen Justice MD         COMPLICATIONS:  none        DISPOSITION:  observation     CONDITION: stable  EBL: <15mL    Pt doing well s/p atrial fibrillation ablation (PFA) with PVI and PWI via b/l FV access. Denies complaint.       MEDICATIONS  (STANDING):    MEDICATIONS  (PRN):  acetaminophen     Tablet .. 650 milliGRAM(s) Oral every 6 hours PRN Mild Pain (1 - 3), Moderate Pain (4 - 6)  ALPRAZolam 0.25 milliGRAM(s) Oral every 6 hours PRN anxiety/insomnia  aluminum hydroxide/magnesium hydroxide/simethicone Suspension 30 milliLiter(s) Oral every 4 hours PRN Dyspepsia  ondansetron Injectable 4 milliGRAM(s) IV Push every 4 hours PRN Nausea and/or Vomiting  oxyCODONE    IR 5 milliGRAM(s) Oral every 6 hours PRN Severe Pain (7 - 10)      Allergies  No Known Drug Allergies  shellfish (Unknown)      Exam:   T(C): 36.7 (11-04-24 @ 07:12), Max: 36.7 (11-04-24 @ 07:12)  HR: 59 (11-04-24 @ 12:30) (58 - 140)  BP: 124/102 (11-04-24 @ 07:12) (124/102 - 133/102)  RR: 16 (11-04-24 @ 12:30) (15 - 16)  SpO2: 98% (11-04-24 @ 12:30) (97% - 98%)  Wt(kg): --  VSS, NAD, A&O x 3  Card: S1/S2, RRR, no m/g/r  Resp: lungs CTA b/l  Abd: S/NT/ND  Groins: hemostatic sutures in place; sites C/D/I; no bleeding, hematoma, erythema, exudate or edema  Ext: no edema; distal pulses intact    I/Os: net +     ECG:    Assessment:   69 year old gentleman with history of HTN, OA, psoriatic arthritis, HLA B27 positive, and persistent atrial fibrillation with associated cardiomyopathy (LVEF 20-25%) s/p urbina ccessful VJ/DCCV (9/24/24) who developed recurrent symptomatic AF shortly after cardioversion and now presents for AF ablation with Dr. Justice.  Now status post uncomplicated pulsed field ablation of atrial fibrillation (PVI, PWI) via b/l femoral venous access. Hemostasis achieved with b/l FO8 suture.      Plan:   Bedrest x 4 hours, then OOB with assistance and progress as tolerated.   Groin sutures to be removed by EP service in AM.   Radial art line to be removed once pt fully awake with stable vitals >1 hour post op.   Pending groin status: Eliquis 5mg PO BID to resume at 1600 tonight.   DO NOT HOLD, INTERRUPT OR REVERSE ANTICOAGULATION WITHOUT EXPLICIT APPROVAL FROM EP SERVICE.    Decrease Toprol to 100mg daily  Discontinue Amiodarone.   Continue other home medications.   Strict I/Os.  Please encourage incentive spirometry and ambulation once able.  Observation and monitoring on telemetry overnight with anticipated discharge in the AM and outpt follow up in 2-4 weeks.  PROCEDURE(S): Pulsed Field Ablation of Atrial Fibrillation    ELECTRPHYSIOLOGIST(S): Stephen Justice MD         COMPLICATIONS:  none        DISPOSITION:  observation     CONDITION: stable  EBL: <15mL    Pt doing well s/p atrial fibrillation ablation (PFA) with PVI and PWI via b/l FV access. Denies complaint.       MEDICATIONS  (STANDING):    MEDICATIONS  (PRN):  acetaminophen     Tablet .. 650 milliGRAM(s) Oral every 6 hours PRN Mild Pain (1 - 3), Moderate Pain (4 - 6)  ALPRAZolam 0.25 milliGRAM(s) Oral every 6 hours PRN anxiety/insomnia  aluminum hydroxide/magnesium hydroxide/simethicone Suspension 30 milliLiter(s) Oral every 4 hours PRN Dyspepsia  ondansetron Injectable 4 milliGRAM(s) IV Push every 4 hours PRN Nausea and/or Vomiting  oxyCODONE    IR 5 milliGRAM(s) Oral every 6 hours PRN Severe Pain (7 - 10)      Allergies  No Known Drug Allergies  shellfish (Unknown)      Exam:   T(C): 36.7 (11-04-24 @ 07:12), Max: 36.7 (11-04-24 @ 07:12)  HR: 59 (11-04-24 @ 12:30) (58 - 140)  BP: 124/102 (11-04-24 @ 07:12) (124/102 - 133/102)  RR: 16 (11-04-24 @ 12:30) (15 - 16)  SpO2: 98% (11-04-24 @ 12:30) (97% - 98%)  Wt(kg): --  VSS, NAD, A&O x 3  Card: S1/S2, RRR, no m/g/r  Resp: lungs CTA b/l  Abd: S/NT/ND  Groins: hemostatic sutures in place; sites C/D/I; no bleeding, hematoma, erythema, exudate or edema  Ext: no edema; distal pulses intact    I/Os: net +     ECG: PENDING    Assessment:   69 year old gentleman with history of HTN, OA, psoriatic arthritis, HLA B27 positive, and persistent atrial fibrillation with associated cardiomyopathy (LVEF 20-25%) s/p urbina ccessful VJ/DCCV (9/24/24) who developed recurrent symptomatic AF shortly after cardioversion and now presents for AF ablation with Dr. Justice.  Now status post uncomplicated pulsed field ablation of atrial fibrillation (PVI, PWI) via b/l femoral venous access. Hemostasis achieved with b/l FO8 suture.      Plan:   Bedrest x 4 hours, then OOB with assistance and progress as tolerated.   Groin sutures to be removed by EP service in AM.   Radial art line to be removed once pt fully awake with stable vitals >1 hour post op.   Pending groin status: Eliquis 5mg PO BID to resume at 1600 tonight.   IVF @ 50/cc hour overnight  DO NOT HOLD, INTERRUPT OR REVERSE ANTICOAGULATION WITHOUT EXPLICIT APPROVAL FROM EP SERVICE.    Decrease Toprol to 100mg daily  Discontinue Amiodarone.   Continue other home medications.   Strict I/Os.  Please encourage incentive spirometry and ambulation once able.  Observation and monitoring on telemetry overnight with anticipated discharge in the AM and outpt follow up in 2-4 weeks.

## 2024-11-04 NOTE — DISCHARGE NOTE PROVIDER - CARE PROVIDER_API CALL
Stephen Justice  Cardiac Electrophysiology  [address] [place] [address]  Phone: [phone]  Fax: [phone]  Follow Up Time: 1 month    Bernabe Pina  Cardiovascular Disease  [address] [facility], [address] [address]  Phone: [phone]  Fax: [phone]  Follow Up Time: Routine

## 2024-11-05 ENCOUNTER — TRANSCRIPTION ENCOUNTER (OUTPATIENT)
Age: 69
End: 2024-11-05

## 2024-11-05 VITALS
OXYGEN SATURATION: 94 % | SYSTOLIC BLOOD PRESSURE: 116 MMHG | RESPIRATION RATE: 18 BRPM | DIASTOLIC BLOOD PRESSURE: 64 MMHG | TEMPERATURE: 98 F | HEART RATE: 58 BPM

## 2024-11-05 LAB
ANION GAP SERPL CALC-SCNC: 9 MMOL/L — SIGNIFICANT CHANGE UP (ref 5–17)
BUN SERPL-MCNC: 29.9 MG/DL — HIGH (ref 8–20)
CALCIUM SERPL-MCNC: 9.2 MG/DL — SIGNIFICANT CHANGE UP (ref 8.4–10.5)
CHLORIDE SERPL-SCNC: 103 MMOL/L — SIGNIFICANT CHANGE UP (ref 96–108)
CO2 SERPL-SCNC: 23 MMOL/L — SIGNIFICANT CHANGE UP (ref 22–29)
CREAT SERPL-MCNC: 1.32 MG/DL — HIGH (ref 0.5–1.3)
EGFR: 58 ML/MIN/1.73M2 — LOW
GLUCOSE SERPL-MCNC: 128 MG/DL — HIGH (ref 70–99)
HCT VFR BLD CALC: 41.8 % — SIGNIFICANT CHANGE UP (ref 39–50)
HGB BLD-MCNC: 13.2 G/DL — SIGNIFICANT CHANGE UP (ref 13–17)
MAGNESIUM SERPL-MCNC: 2 MG/DL — SIGNIFICANT CHANGE UP (ref 1.6–2.6)
MCHC RBC-ENTMCNC: 28.3 PG — SIGNIFICANT CHANGE UP (ref 27–34)
MCHC RBC-ENTMCNC: 31.6 G/DL — LOW (ref 32–36)
MCV RBC AUTO: 89.7 FL — SIGNIFICANT CHANGE UP (ref 80–100)
PLATELET # BLD AUTO: 196 K/UL — SIGNIFICANT CHANGE UP (ref 150–400)
POTASSIUM SERPL-MCNC: 4.8 MMOL/L — SIGNIFICANT CHANGE UP (ref 3.5–5.3)
POTASSIUM SERPL-MCNC: 5.5 MMOL/L — HIGH (ref 3.5–5.3)
POTASSIUM SERPL-SCNC: 4.8 MMOL/L — SIGNIFICANT CHANGE UP (ref 3.5–5.3)
POTASSIUM SERPL-SCNC: 5.5 MMOL/L — HIGH (ref 3.5–5.3)
RBC # BLD: 4.66 M/UL — SIGNIFICANT CHANGE UP (ref 4.2–5.8)
RBC # FLD: 14.8 % — HIGH (ref 10.3–14.5)
SODIUM SERPL-SCNC: 135 MMOL/L — SIGNIFICANT CHANGE UP (ref 135–145)
WBC # BLD: 13 K/UL — HIGH (ref 3.8–10.5)
WBC # FLD AUTO: 13 K/UL — HIGH (ref 3.8–10.5)

## 2024-11-05 PROCEDURE — C1766: CPT

## 2024-11-05 PROCEDURE — 93005 ELECTROCARDIOGRAM TRACING: CPT

## 2024-11-05 PROCEDURE — 80048 BASIC METABOLIC PNL TOTAL CA: CPT

## 2024-11-05 PROCEDURE — 93656 COMPRE EP EVAL ABLTJ ATR FIB: CPT

## 2024-11-05 PROCEDURE — 86900 BLOOD TYPING SEROLOGIC ABO: CPT

## 2024-11-05 PROCEDURE — C1893: CPT

## 2024-11-05 PROCEDURE — 93657 TX L/R ATRIAL FIB ADDL: CPT

## 2024-11-05 PROCEDURE — C1769: CPT

## 2024-11-05 PROCEDURE — C1733: CPT

## 2024-11-05 PROCEDURE — C1894: CPT

## 2024-11-05 PROCEDURE — C1731: CPT

## 2024-11-05 PROCEDURE — 36415 COLL VENOUS BLD VENIPUNCTURE: CPT

## 2024-11-05 PROCEDURE — 86901 BLOOD TYPING SEROLOGIC RH(D): CPT

## 2024-11-05 PROCEDURE — C1732: CPT

## 2024-11-05 PROCEDURE — C1759: CPT

## 2024-11-05 PROCEDURE — 85027 COMPLETE CBC AUTOMATED: CPT

## 2024-11-05 PROCEDURE — 85610 PROTHROMBIN TIME: CPT

## 2024-11-05 PROCEDURE — 83735 ASSAY OF MAGNESIUM: CPT

## 2024-11-05 PROCEDURE — 93010 ELECTROCARDIOGRAM REPORT: CPT

## 2024-11-05 PROCEDURE — 84132 ASSAY OF SERUM POTASSIUM: CPT

## 2024-11-05 PROCEDURE — 86850 RBC ANTIBODY SCREEN: CPT

## 2024-11-05 PROCEDURE — 85730 THROMBOPLASTIN TIME PARTIAL: CPT

## 2024-11-05 RX ORDER — PANTOPRAZOLE SODIUM 40 MG/1
1 TABLET, DELAYED RELEASE ORAL
Qty: 7 | Refills: 0
Start: 2024-11-05 | End: 2024-11-11

## 2024-11-05 RX ORDER — METOPROLOL TARTRATE 50 MG
1 TABLET ORAL
Qty: 0 | Refills: 0 | DISCHARGE
Start: 2024-11-05

## 2024-11-05 RX ADMIN — APIXABAN 5 MILLIGRAM(S): 5 TABLET, FILM COATED ORAL at 06:06

## 2024-11-05 RX ADMIN — Medication 100 MILLIGRAM(S): at 06:07

## 2024-11-05 RX ADMIN — SACUBITRIL AND VALSARTAN 1 TABLET(S): 97; 103 TABLET, FILM COATED ORAL at 06:06

## 2024-11-05 NOTE — DISCHARGE NOTE NURSING/CASE MANAGEMENT/SOCIAL WORK - NSDCPEFALRISK_GEN_ALL_CORE
For information on Fall & Injury Prevention, visit: [email or web address] OR  [email or web address] OR  https://www.cdc.gov/steadi/patient.html

## 2024-11-05 NOTE — DISCHARGE NOTE NURSING/CASE MANAGEMENT/SOCIAL WORK - NSDCVIVACCINE_GEN_ALL_CORE_FT
Tdap; 14-Jan-2017 15:48; Ivis Valenzuela (RN); [employer]; [id number]; IntraMuscular; Deltoid Right.; 0.5 milliLiter(s); VIS (VIS Published: 09-May-2013, VIS Presented: 14-Jan-2017);

## 2024-11-05 NOTE — DISCHARGE NOTE NURSING/CASE MANAGEMENT/SOCIAL WORK - FINANCIAL ASSISTANCE
[facility] provides services at a reduced cost to those who are determined to be eligible through [facility]’s financial assistance program. Information regarding [facility]’s financial assistance program can be found by going to [email or web address] or by calling [phone].

## 2024-11-05 NOTE — DISCHARGE NOTE NURSING/CASE MANAGEMENT/SOCIAL WORK - PATIENT PORTAL LINK FT
You can access the FollowMyHealth Patient Portal offered by [facility] by registering at the following website: [email or web address]. By joining [employer]’s FollowMyHealth portal, you will also be able to view your health information using other applications (apps) compatible with our system.

## 2024-11-05 NOTE — PROGRESS NOTE ADULT - SUBJECTIVE AND OBJECTIVE BOX
Pt doing well POD #1 s/p post uncomplicated pulsed field ablation of atrial fibrillation (PVI, PWI). b/l groin sutures removed at bedside. Pt denies any complaints at time of assessment.      EKG: Pending  TELE: Sinus rhythm with intact conduction    MEDICATIONS  (STANDING):  apixaban 5 milliGRAM(s) Oral <User Schedule>  atorvastatin 20 milliGRAM(s) Oral at bedtime  metoprolol succinate  milliGRAM(s) Oral daily  sacubitril 24 mG/valsartan 26 mG 1 Tablet(s) Oral two times a day  sodium chloride 0.9%. 1000 milliLiter(s) (50 mL/Hr) IV Continuous <Continuous>    MEDICATIONS  (PRN):  acetaminophen     Tablet .. 650 milliGRAM(s) Oral every 6 hours PRN Mild Pain (1 - 3), Moderate Pain (4 - 6)  ALPRAZolam 0.25 milliGRAM(s) Oral every 6 hours PRN anxiety/insomnia  aluminum hydroxide/magnesium hydroxide/simethicone Suspension 30 milliLiter(s) Oral every 4 hours PRN Dyspepsia  benzocaine/menthol Lozenge 1 Lozenge Oral every 2 hours PRN Sore Throat  ondansetron Injectable 4 milliGRAM(s) IV Push every 4 hours PRN Nausea and/or Vomiting  oxyCODONE    IR 5 milliGRAM(s) Oral every 6 hours PRN Severe Pain (7 - 10)      Allergies    No Known Drug Allergies  shellfish (Unknown)    Intolerances      PAST MEDICAL & SURGICAL HISTORY:  HTN (hypertension)      HLD (hyperlipidemia)      OA (osteoarthritis)      Psoriasis      Unspecified atrial fibrillation      Severe left ventricular systolic dysfunction (LVSD)      Persistent atrial fibrillation      H/O elbow surgery      History of hip surgery          Vital Signs Last 24 Hrs  T(C): 36.5 (05 Nov 2024 06:34), Max: 36.7 (04 Nov 2024 20:56)  T(F): 97.7 (05 Nov 2024 06:34), Max: 98 (04 Nov 2024 20:56)  HR: 58 (05 Nov 2024 06:34) (58 - 62)  BP: 122/66 (05 Nov 2024 06:34) (118/67 - 125/76)  BP(mean): 84 (05 Nov 2024 00:00) (84 - 86)  RR: 18 (05 Nov 2024 06:34) (16 - 18)  SpO2: 98% (05 Nov 2024 06:34) (95% - 99%)    Parameters below as of 05 Nov 2024 06:34  Patient On (Oxygen Delivery Method): room air        Physical Exam:  Constitutional: NAD, AAOx3  Cardiovascular: +S1S2 RRR  Pulmonary: CTA b/l, unlabored  Abd: soft NTND +BS  Groins: C/D/I bilaterally; no bleeding, hematoma, edema  Extremities: no pedal edema, +distal pulses b/l  Neuro: non focal, YIP x4    LABS:                        13.2   13.00 )-----------( 196      ( 05 Nov 2024 04:44 )             41.8     11-05    135  |  103  |  29.9[H]  ----------------------------<  128[H]  5.5[H]   |  23.0  |  1.32[H]    Ca    9.2      05 Nov 2024 04:44  Mg     2.0     11-05      PT/INR - ( 04 Nov 2024 07:55 )   PT: 13.6 sec;   INR: 1.21 ratio         PTT - ( 04 Nov 2024 07:55 )  PTT:35.5 sec  Urinalysis Basic - ( 05 Nov 2024 04:44 )    Color: x / Appearance: x / SG: x / pH: x  Gluc: 128 mg/dL / Ketone: x  / Bili: x / Urobili: x   Blood: x / Protein: x / Nitrite: x   Leuk Esterase: x / RBC: x / WBC x   Sq Epi: x / Non Sq Epi: x / Bacteria: x        Assessment:   69 year old gentleman with history of HTN, OA, psoriatic arthritis, HLA B27 positive, and persistent atrial fibrillation with associated cardiomyopathy (LVEF 20-25%) s/p urbina ccessful VJ/DCCV (9/24/24) who developed recurrent symptomatic AF shortly after cardioversion. Pt is now POD # 1 s/p uncomplicated pulsed field ablation of atrial fibrillation (PVI, PWI) via b/l femoral venous access.  b/l groin sutures removed at bedside. Pt denies any complaints at time of assessment.        Plan:   Stop Amiodarone  Continue with Eliquis 5mg Q12HR  Decrease Toprol to 100mg QD  Repeat serum K today  Pt instructed as activity limitations - no lifting/pushing/pulling >10 lbs or strenuous exercise x 1 week.   Pt instructed as to access site care and f/up - written instructions included in d/c documents.  Outpt f/up in 2-4 weeks - office will contact pt to schedule.     Pt doing well POD #1 s/p post uncomplicated pulsed field ablation of atrial fibrillation (PVI, PWI). b/l groin sutures removed at bedside. Pt denies any complaints at time of assessment.      EKG: Sinus rhythm with intact conduction  TELE: Sinus rhythm with intact conduction    MEDICATIONS  (STANDING):  apixaban 5 milliGRAM(s) Oral <User Schedule>  atorvastatin 20 milliGRAM(s) Oral at bedtime  metoprolol succinate  milliGRAM(s) Oral daily  sacubitril 24 mG/valsartan 26 mG 1 Tablet(s) Oral two times a day  sodium chloride 0.9%. 1000 milliLiter(s) (50 mL/Hr) IV Continuous <Continuous>    MEDICATIONS  (PRN):  acetaminophen     Tablet .. 650 milliGRAM(s) Oral every 6 hours PRN Mild Pain (1 - 3), Moderate Pain (4 - 6)  ALPRAZolam 0.25 milliGRAM(s) Oral every 6 hours PRN anxiety/insomnia  aluminum hydroxide/magnesium hydroxide/simethicone Suspension 30 milliLiter(s) Oral every 4 hours PRN Dyspepsia  benzocaine/menthol Lozenge 1 Lozenge Oral every 2 hours PRN Sore Throat  ondansetron Injectable 4 milliGRAM(s) IV Push every 4 hours PRN Nausea and/or Vomiting  oxyCODONE    IR 5 milliGRAM(s) Oral every 6 hours PRN Severe Pain (7 - 10)      Allergies    No Known Drug Allergies  shellfish (Unknown)    Intolerances      PAST MEDICAL & SURGICAL HISTORY:  HTN (hypertension)      HLD (hyperlipidemia)      OA (osteoarthritis)      Psoriasis      Unspecified atrial fibrillation      Severe left ventricular systolic dysfunction (LVSD)      Persistent atrial fibrillation      H/O elbow surgery      History of hip surgery          Vital Signs Last 24 Hrs  T(C): 36.5 (05 Nov 2024 06:34), Max: 36.7 (04 Nov 2024 20:56)  T(F): 97.7 (05 Nov 2024 06:34), Max: 98 (04 Nov 2024 20:56)  HR: 58 (05 Nov 2024 06:34) (58 - 62)  BP: 122/66 (05 Nov 2024 06:34) (118/67 - 125/76)  BP(mean): 84 (05 Nov 2024 00:00) (84 - 86)  RR: 18 (05 Nov 2024 06:34) (16 - 18)  SpO2: 98% (05 Nov 2024 06:34) (95% - 99%)    Parameters below as of 05 Nov 2024 06:34  Patient On (Oxygen Delivery Method): room air        Physical Exam:  Constitutional: NAD, AAOx3  Cardiovascular: +S1S2 RRR  Pulmonary: CTA b/l, unlabored  Abd: soft NTND +BS  Groins: C/D/I bilaterally; no bleeding, hematoma, edema  Extremities: no pedal edema, +distal pulses b/l  Neuro: non focal, YIP x4    LABS:                        13.2   13.00 )-----------( 196      ( 05 Nov 2024 04:44 )             41.8     11-05    135  |  103  |  29.9[H]  ----------------------------<  128[H]  5.5[H]   |  23.0  |  1.32[H]    Ca    9.2      05 Nov 2024 04:44  Mg     2.0     11-05      PT/INR - ( 04 Nov 2024 07:55 )   PT: 13.6 sec;   INR: 1.21 ratio         PTT - ( 04 Nov 2024 07:55 )  PTT:35.5 sec  Urinalysis Basic - ( 05 Nov 2024 04:44 )    Color: x / Appearance: x / SG: x / pH: x  Gluc: 128 mg/dL / Ketone: x  / Bili: x / Urobili: x   Blood: x / Protein: x / Nitrite: x   Leuk Esterase: x / RBC: x / WBC x   Sq Epi: x / Non Sq Epi: x / Bacteria: x        Assessment:   69 year old gentleman with history of HTN, OA, psoriatic arthritis, HLA B27 positive, and persistent atrial fibrillation with associated cardiomyopathy (LVEF 20-25%) s/p urbina ccessful VJ/DCCV (9/24/24) who developed recurrent symptomatic AF shortly after cardioversion. Pt is now POD # 1 s/p uncomplicated pulsed field ablation of atrial fibrillation (PVI, PWI) via b/l femoral venous access.  b/l groin sutures removed at bedside. Pt denies any complaints at time of assessment.        Plan:   Stop Amiodarone  Continue with Eliquis 5mg Q12HR  Decrease Toprol to 100mg QD  Repeat serum K today  Pt instructed as activity limitations - no lifting/pushing/pulling >10 lbs or strenuous exercise x 1 week.   Pt instructed as to access site care and f/up - written instructions included in d/c documents.  Outpt f/up in 2-4 weeks - office will contact pt to schedule.

## 2024-11-08 PROBLEM — I48.19 OTHER PERSISTENT ATRIAL FIBRILLATION: Chronic | Status: ACTIVE | Noted: 2024-11-04

## 2024-11-08 PROBLEM — I51.9 HEART DISEASE, UNSPECIFIED: Chronic | Status: ACTIVE | Noted: 2024-11-04

## 2024-11-14 ENCOUNTER — APPOINTMENT (OUTPATIENT)
Dept: RHEUMATOLOGY | Facility: CLINIC | Age: 69
End: 2024-11-14
Payer: COMMERCIAL

## 2024-11-14 VITALS
BODY MASS INDEX: 27.72 KG/M2 | DIASTOLIC BLOOD PRESSURE: 88 MMHG | OXYGEN SATURATION: 97 % | HEIGHT: 71 IN | HEART RATE: 60 BPM | SYSTOLIC BLOOD PRESSURE: 140 MMHG | WEIGHT: 198 LBS | TEMPERATURE: 97.5 F

## 2024-11-14 DIAGNOSIS — M17.0 BILATERAL PRIMARY OSTEOARTHRITIS OF KNEE: ICD-10-CM

## 2024-11-14 DIAGNOSIS — R70.0 ELEVATED ERYTHROCYTE SEDIMENTATION RATE: ICD-10-CM

## 2024-11-14 DIAGNOSIS — Z15.89 GENETIC SUSCEPTIBILITY TO OTHER DISEASE: ICD-10-CM

## 2024-11-14 DIAGNOSIS — L40.50 ARTHROPATHIC PSORIASIS, UNSPECIFIED: ICD-10-CM

## 2024-11-14 DIAGNOSIS — M43.10 SPONDYLOLISTHESIS, SITE UNSPECIFIED: ICD-10-CM

## 2024-11-14 DIAGNOSIS — L40.9 PSORIASIS, UNSPECIFIED: ICD-10-CM

## 2024-11-14 PROCEDURE — 36415 COLL VENOUS BLD VENIPUNCTURE: CPT

## 2024-11-14 PROCEDURE — G2211 COMPLEX E/M VISIT ADD ON: CPT

## 2024-11-14 PROCEDURE — 99214 OFFICE O/P EST MOD 30 MIN: CPT

## 2024-11-14 RX ORDER — METOPROLOL SUCCINATE 200 MG/1
TABLET, EXTENDED RELEASE ORAL
Refills: 0 | Status: ACTIVE | COMMUNITY

## 2024-11-14 RX ORDER — APIXABAN 5 MG/1
TABLET, FILM COATED ORAL
Refills: 0 | Status: ACTIVE | COMMUNITY

## 2024-11-14 RX ORDER — SACUBITRIL AND VALSARTAN 49; 51 MG/1; MG/1
TABLET, FILM COATED ORAL
Refills: 0 | Status: ACTIVE | COMMUNITY

## 2024-11-21 LAB
ALBUMIN SERPL ELPH-MCNC: 3.9 G/DL
ALP BLD-CCNC: 166 U/L
ALT SERPL-CCNC: 20 U/L
ANION GAP SERPL CALC-SCNC: 12 MMOL/L
AST SERPL-CCNC: 16 U/L
BASOPHILS # BLD AUTO: 0.07 K/UL
BASOPHILS NFR BLD AUTO: 1 %
BILIRUB SERPL-MCNC: 0.6 MG/DL
BUN SERPL-MCNC: 14 MG/DL
CALCIUM SERPL-MCNC: 10 MG/DL
CHLORIDE SERPL-SCNC: 105 MMOL/L
CO2 SERPL-SCNC: 27 MMOL/L
CREAT SERPL-MCNC: 0.9 MG/DL
CRP SERPL-MCNC: 13 MG/L
EGFR: 91 ML/MIN/1.73M2
EOSINOPHIL # BLD AUTO: 0.22 K/UL
EOSINOPHIL NFR BLD AUTO: 3.1 %
ERYTHROCYTE [SEDIMENTATION RATE] IN BLOOD BY WESTERGREN METHOD: 24 MM/HR
GLUCOSE SERPL-MCNC: 108 MG/DL
HAV IGM SER QL: NONREACTIVE
HBV CORE IGM SER QL: NONREACTIVE
HBV SURFACE AG SER QL: NONREACTIVE
HCT VFR BLD CALC: 45.5 %
HCV AB SER QL: NONREACTIVE
HCV S/CO RATIO: 0.09 S/CO
HGB BLD-MCNC: 14.4 G/DL
HIV1+2 AB SPEC QL IA.RAPID: NONREACTIVE
IMM GRANULOCYTES NFR BLD AUTO: 0.4 %
LYMPHOCYTES # BLD AUTO: 1.05 K/UL
LYMPHOCYTES NFR BLD AUTO: 14.9 %
M TB IFN-G BLD-IMP: NEGATIVE
MAN DIFF?: NORMAL
MCHC RBC-ENTMCNC: 28.7 PG
MCHC RBC-ENTMCNC: 31.6 G/DL
MCV RBC AUTO: 90.6 FL
MONOCYTES # BLD AUTO: 0.67 K/UL
MONOCYTES NFR BLD AUTO: 9.5 %
NEUTROPHILS # BLD AUTO: 4.99 K/UL
NEUTROPHILS NFR BLD AUTO: 71.1 %
PLATELET # BLD AUTO: 252 K/UL
POTASSIUM SERPL-SCNC: 4.8 MMOL/L
PROT SERPL-MCNC: 6.5 G/DL
QUANTIFERON TB PLUS MITOGEN MINUS NIL: 0.81 IU/ML
QUANTIFERON TB PLUS NIL: 0.03 IU/ML
QUANTIFERON TB PLUS TB1 MINUS NIL: 0 IU/ML
QUANTIFERON TB PLUS TB2 MINUS NIL: 0 IU/ML
RBC # BLD: 5.02 M/UL
RBC # FLD: 15.8 %
SODIUM SERPL-SCNC: 144 MMOL/L
WBC # FLD AUTO: 7.03 K/UL

## 2024-12-05 ENCOUNTER — APPOINTMENT (OUTPATIENT)
Dept: ELECTROPHYSIOLOGY | Facility: CLINIC | Age: 69
End: 2024-12-05
Payer: COMMERCIAL

## 2024-12-05 VITALS
BODY MASS INDEX: 27.72 KG/M2 | SYSTOLIC BLOOD PRESSURE: 138 MMHG | WEIGHT: 198 LBS | DIASTOLIC BLOOD PRESSURE: 72 MMHG | HEART RATE: 58 BPM | HEIGHT: 71 IN | OXYGEN SATURATION: 98 %

## 2024-12-05 DIAGNOSIS — Z98.890 OTHER SPECIFIED POSTPROCEDURAL STATES: ICD-10-CM

## 2024-12-05 DIAGNOSIS — Z86.79 OTHER SPECIFIED POSTPROCEDURAL STATES: ICD-10-CM

## 2024-12-05 PROCEDURE — 99214 OFFICE O/P EST MOD 30 MIN: CPT

## 2024-12-05 PROCEDURE — 93000 ELECTROCARDIOGRAM COMPLETE: CPT

## 2024-12-05 PROCEDURE — G2211 COMPLEX E/M VISIT ADD ON: CPT

## 2025-02-03 ENCOUNTER — APPOINTMENT (OUTPATIENT)
Dept: RHEUMATOLOGY | Facility: CLINIC | Age: 70
End: 2025-02-03
Payer: COMMERCIAL

## 2025-02-03 DIAGNOSIS — Z15.89 GENETIC SUSCEPTIBILITY TO OTHER DISEASE: ICD-10-CM

## 2025-02-03 DIAGNOSIS — L40.9 PSORIASIS, UNSPECIFIED: ICD-10-CM

## 2025-02-03 DIAGNOSIS — R79.82 ELEVATED C-REACTIVE PROTEIN (CRP): ICD-10-CM

## 2025-02-03 DIAGNOSIS — L40.50 ARTHROPATHIC PSORIASIS, UNSPECIFIED: ICD-10-CM

## 2025-02-03 DIAGNOSIS — M43.10 SPONDYLOLISTHESIS, SITE UNSPECIFIED: ICD-10-CM

## 2025-02-03 DIAGNOSIS — M17.0 BILATERAL PRIMARY OSTEOARTHRITIS OF KNEE: ICD-10-CM

## 2025-02-03 DIAGNOSIS — R70.0 ELEVATED ERYTHROCYTE SEDIMENTATION RATE: ICD-10-CM

## 2025-02-03 PROCEDURE — G2211 COMPLEX E/M VISIT ADD ON: CPT | Mod: 95

## 2025-02-03 PROCEDURE — 99214 OFFICE O/P EST MOD 30 MIN: CPT | Mod: 95

## 2025-04-10 ENCOUNTER — APPOINTMENT (OUTPATIENT)
Dept: RHEUMATOLOGY | Facility: CLINIC | Age: 70
End: 2025-04-10
Payer: COMMERCIAL

## 2025-04-10 DIAGNOSIS — L40.9 PSORIASIS, UNSPECIFIED: ICD-10-CM

## 2025-04-10 DIAGNOSIS — Z15.89 GENETIC SUSCEPTIBILITY TO OTHER DISEASE: ICD-10-CM

## 2025-04-10 DIAGNOSIS — R70.0 ELEVATED ERYTHROCYTE SEDIMENTATION RATE: ICD-10-CM

## 2025-04-10 DIAGNOSIS — M17.0 BILATERAL PRIMARY OSTEOARTHRITIS OF KNEE: ICD-10-CM

## 2025-04-10 DIAGNOSIS — L40.50 ARTHROPATHIC PSORIASIS, UNSPECIFIED: ICD-10-CM

## 2025-04-10 DIAGNOSIS — M43.10 SPONDYLOLISTHESIS, SITE UNSPECIFIED: ICD-10-CM

## 2025-04-10 PROCEDURE — 99214 OFFICE O/P EST MOD 30 MIN: CPT | Mod: 95

## 2025-04-10 PROCEDURE — G2211 COMPLEX E/M VISIT ADD ON: CPT | Mod: 95

## 2025-06-03 ENCOUNTER — NON-APPOINTMENT (OUTPATIENT)
Age: 70
End: 2025-06-03

## 2025-06-05 ENCOUNTER — APPOINTMENT (OUTPATIENT)
Dept: ELECTROPHYSIOLOGY | Facility: CLINIC | Age: 70
End: 2025-06-05
Payer: COMMERCIAL

## 2025-06-05 VITALS
BODY MASS INDEX: 28.28 KG/M2 | WEIGHT: 202 LBS | HEART RATE: 51 BPM | OXYGEN SATURATION: 97 % | SYSTOLIC BLOOD PRESSURE: 160 MMHG | HEIGHT: 71 IN | DIASTOLIC BLOOD PRESSURE: 80 MMHG

## 2025-06-05 DIAGNOSIS — I48.19 OTHER PERSISTENT ATRIAL FIBRILLATION: ICD-10-CM

## 2025-06-05 DIAGNOSIS — I48.91 UNSPECIFIED ATRIAL FIBRILLATION: ICD-10-CM

## 2025-06-05 DIAGNOSIS — Z98.890 OTHER SPECIFIED POSTPROCEDURAL STATES: ICD-10-CM

## 2025-06-05 DIAGNOSIS — Z86.79 OTHER SPECIFIED POSTPROCEDURAL STATES: ICD-10-CM

## 2025-06-05 PROCEDURE — 93000 ELECTROCARDIOGRAM COMPLETE: CPT | Mod: 59

## 2025-06-05 PROCEDURE — 99214 OFFICE O/P EST MOD 30 MIN: CPT

## 2025-06-05 RX ORDER — NEBIVOLOL 10 MG/1
10 TABLET ORAL DAILY
Refills: 0 | Status: ACTIVE | COMMUNITY
Start: 2025-06-05

## 2025-06-05 RX ORDER — VALSARTAN 320 MG/1
320 TABLET, COATED ORAL DAILY
Refills: 0 | Status: ACTIVE | COMMUNITY

## 2025-06-05 RX ORDER — ATORVASTATIN CALCIUM 20 MG/1
20 TABLET, FILM COATED ORAL
Qty: 1 | Refills: 3 | Status: ACTIVE | COMMUNITY
Start: 2025-06-05

## 2025-07-11 ENCOUNTER — TRANSCRIPTION ENCOUNTER (OUTPATIENT)
Age: 70
End: 2025-07-11

## 2025-07-28 ENCOUNTER — RX RENEWAL (OUTPATIENT)
Age: 70
End: 2025-07-28

## 2025-07-28 ENCOUNTER — APPOINTMENT (OUTPATIENT)
Dept: RHEUMATOLOGY | Facility: CLINIC | Age: 70
End: 2025-07-28
Payer: COMMERCIAL

## 2025-07-28 DIAGNOSIS — L40.50 ARTHROPATHIC PSORIASIS, UNSPECIFIED: ICD-10-CM

## 2025-07-28 DIAGNOSIS — Z15.89 GENETIC SUSCEPTIBILITY TO OTHER DISEASE: ICD-10-CM

## 2025-07-28 DIAGNOSIS — R70.0 ELEVATED ERYTHROCYTE SEDIMENTATION RATE: ICD-10-CM

## 2025-07-28 DIAGNOSIS — L40.9 PSORIASIS, UNSPECIFIED: ICD-10-CM

## 2025-07-28 DIAGNOSIS — M43.10 SPONDYLOLISTHESIS, SITE UNSPECIFIED: ICD-10-CM

## 2025-07-28 DIAGNOSIS — M17.0 BILATERAL PRIMARY OSTEOARTHRITIS OF KNEE: ICD-10-CM

## 2025-07-28 PROCEDURE — G2211 COMPLEX E/M VISIT ADD ON: CPT | Mod: 95

## 2025-07-28 PROCEDURE — 99214 OFFICE O/P EST MOD 30 MIN: CPT | Mod: 95

## 2025-07-28 RX ORDER — CLOBETASOL PROPIONATE 0.05 G/100ML
0.05 SHAMPOO TOPICAL DAILY
Qty: 1 | Refills: 0 | Status: ACTIVE | COMMUNITY
Start: 2025-07-28 | End: 1900-01-01

## 2025-07-28 RX ORDER — CLOBETASOL PROPIONATE 0.5 MG/G
0.05 OINTMENT TOPICAL
Qty: 45 | Refills: 0 | Status: ACTIVE | COMMUNITY
Start: 2025-07-28 | End: 1900-01-01